# Patient Record
Sex: MALE | Race: WHITE | Employment: OTHER | URBAN - METROPOLITAN AREA
[De-identification: names, ages, dates, MRNs, and addresses within clinical notes are randomized per-mention and may not be internally consistent; named-entity substitution may affect disease eponyms.]

---

## 2017-06-20 PROBLEM — M25.522 LEFT ELBOW PAIN: Status: ACTIVE | Noted: 2017-06-20

## 2017-06-20 PROBLEM — M70.22 OLECRANON BURSITIS OF LEFT ELBOW: Status: ACTIVE | Noted: 2017-06-20

## 2017-09-20 PROBLEM — G89.29 CHRONIC PAIN OF RIGHT KNEE: Status: ACTIVE | Noted: 2017-09-20

## 2017-09-20 PROBLEM — M25.561 CHRONIC PAIN OF RIGHT KNEE: Status: ACTIVE | Noted: 2017-09-20

## 2018-03-13 ENCOUNTER — OFFICE VISIT (OUTPATIENT)
Dept: ORTHOPEDIC SURGERY | Age: 78
End: 2018-03-13
Payer: MEDICARE

## 2018-03-13 VITALS
TEMPERATURE: 97.7 F | HEIGHT: 67 IN | DIASTOLIC BLOOD PRESSURE: 98 MMHG | HEART RATE: 58 BPM | BODY MASS INDEX: 26.68 KG/M2 | WEIGHT: 170 LBS | SYSTOLIC BLOOD PRESSURE: 169 MMHG

## 2018-03-13 DIAGNOSIS — M25.552 LEFT HIP PAIN: ICD-10-CM

## 2018-03-13 DIAGNOSIS — M25.552 HIP PAIN, LEFT: Primary | ICD-10-CM

## 2018-03-13 PROCEDURE — G8427 DOCREV CUR MEDS BY ELIG CLIN: HCPCS | Performed by: ORTHOPAEDIC SURGERY

## 2018-03-13 PROCEDURE — G8417 CALC BMI ABV UP PARAM F/U: HCPCS | Performed by: ORTHOPAEDIC SURGERY

## 2018-03-13 PROCEDURE — G8484 FLU IMMUNIZE NO ADMIN: HCPCS | Performed by: ORTHOPAEDIC SURGERY

## 2018-03-13 PROCEDURE — 99213 OFFICE O/P EST LOW 20 MIN: CPT | Performed by: ORTHOPAEDIC SURGERY

## 2018-03-13 PROCEDURE — G8598 ASA/ANTIPLAT THER USED: HCPCS | Performed by: ORTHOPAEDIC SURGERY

## 2018-03-13 PROCEDURE — 4040F PNEUMOC VAC/ADMIN/RCVD: CPT | Performed by: ORTHOPAEDIC SURGERY

## 2018-03-13 PROCEDURE — 1036F TOBACCO NON-USER: CPT | Performed by: ORTHOPAEDIC SURGERY

## 2018-03-13 PROCEDURE — 1123F ACP DISCUSS/DSCN MKR DOCD: CPT | Performed by: ORTHOPAEDIC SURGERY

## 2018-03-14 ENCOUNTER — HOSPITAL ENCOUNTER (OUTPATIENT)
Age: 78
Discharge: HOME | End: 2018-03-16
Payer: MEDICARE

## 2018-03-14 LAB
ALBUMIN SERPL-MCNC: 4.7 G/DL (ref 3.5–5.2)
ALP BLD-CCNC: 71 U/L (ref 40–129)
ALT SERPL-CCNC: 27 U/L (ref 0–40)
AST SERPL-CCNC: 44 U/L (ref 0–39)
BILIRUB SERPL-MCNC: 0.7 MG/DL (ref 0–1.2)
BILIRUBIN DIRECT: <0.2 MG/DL (ref 0–0.3)
BILIRUBIN, INDIRECT: ABNORMAL MG/DL (ref 0–1)
CHOLESTEROL, TOTAL: 187 MG/DL (ref 0–199)
HDLC SERPL-MCNC: 59 MG/DL
LDL CHOLESTEROL CALCULATED: 102 MG/DL (ref 0–99)
TOTAL PROTEIN: 7.5 G/DL (ref 6.4–8.3)
TRIGL SERPL-MCNC: 128 MG/DL (ref 0–149)
VLDLC SERPL CALC-MCNC: 26 MG/DL

## 2018-03-14 PROCEDURE — 80061 LIPID PANEL: CPT

## 2018-03-14 PROCEDURE — 80076 HEPATIC FUNCTION PANEL: CPT

## 2018-07-08 ENCOUNTER — APPOINTMENT (OUTPATIENT)
Dept: GENERAL RADIOLOGY | Age: 78
End: 2018-07-08
Payer: MEDICARE

## 2018-07-08 ENCOUNTER — HOSPITAL ENCOUNTER (EMERGENCY)
Age: 78
Discharge: HOME | End: 2018-07-08
Attending: EMERGENCY MEDICINE
Payer: MEDICARE

## 2018-07-08 VITALS
BODY MASS INDEX: 25.76 KG/M2 | HEART RATE: 73 BPM | WEIGHT: 170 LBS | DIASTOLIC BLOOD PRESSURE: 87 MMHG | OXYGEN SATURATION: 95 % | HEIGHT: 68 IN | SYSTOLIC BLOOD PRESSURE: 166 MMHG | RESPIRATION RATE: 18 BRPM

## 2018-07-08 DIAGNOSIS — M79.601 RIGHT ARM PAIN: ICD-10-CM

## 2018-07-08 DIAGNOSIS — M79.602 LEFT ARM PAIN: Primary | ICD-10-CM

## 2018-07-08 PROCEDURE — 96372 THER/PROPH/DIAG INJ SC/IM: CPT

## 2018-07-08 PROCEDURE — 6360000002 HC RX W HCPCS: Performed by: PHYSICIAN ASSISTANT

## 2018-07-08 PROCEDURE — 73070 X-RAY EXAM OF ELBOW: CPT

## 2018-07-08 PROCEDURE — 99283 EMERGENCY DEPT VISIT LOW MDM: CPT

## 2018-07-08 RX ORDER — NAPROXEN 500 MG/1
500 TABLET ORAL 2 TIMES DAILY
Qty: 30 TABLET | Refills: 0 | Status: SHIPPED | OUTPATIENT
Start: 2018-07-08 | End: 2019-01-01 | Stop reason: ALTCHOICE

## 2018-07-08 RX ORDER — KETOROLAC TROMETHAMINE 30 MG/ML
30 INJECTION, SOLUTION INTRAMUSCULAR; INTRAVENOUS ONCE
Status: COMPLETED | OUTPATIENT
Start: 2018-07-08 | End: 2018-07-08

## 2018-07-08 RX ORDER — DEXAMETHASONE SODIUM PHOSPHATE 10 MG/ML
10 INJECTION INTRAMUSCULAR; INTRAVENOUS ONCE
Status: COMPLETED | OUTPATIENT
Start: 2018-07-08 | End: 2018-07-08

## 2018-07-08 RX ADMIN — DEXAMETHASONE SODIUM PHOSPHATE 10 MG: 10 INJECTION INTRAMUSCULAR; INTRAVENOUS at 08:52

## 2018-07-08 RX ADMIN — KETOROLAC TROMETHAMINE 30 MG: 30 INJECTION, SOLUTION INTRAMUSCULAR at 08:53

## 2018-07-08 ASSESSMENT — PAIN DESCRIPTION - PAIN TYPE: TYPE: ACUTE PAIN

## 2018-07-08 ASSESSMENT — PAIN SCALES - GENERAL
PAINLEVEL_OUTOF10: 7
PAINLEVEL_OUTOF10: 6

## 2018-07-08 ASSESSMENT — PAIN DESCRIPTION - ORIENTATION: ORIENTATION: RIGHT;LEFT;UPPER

## 2018-07-08 ASSESSMENT — PAIN DESCRIPTION - LOCATION: LOCATION: ARM

## 2018-07-08 NOTE — ED PROVIDER NOTES
Coronary angioplasty with stent; Colonoscopy (7/26/2012); and Endoscopy, colon, diagnostic. Social History:  reports that he has quit smoking. He has never used smokeless tobacco. He reports that he does not drink alcohol or use drugs. Family History: family history includes Cancer in his brother and sister; Heart Disease in his father and mother. The patients home medications have been reviewed. Allergies: Patient has no known allergies. -------------------------------------------------- RESULTS -------------------------------------------------  All laboratory and radiology results have been personally reviewed by myself   LABS:  No results found for this visit on 07/08/18. RADIOLOGY:  Interpreted by Radiologist.  XR ELBOW RIGHT (2 VIEWS)   Final Result   Mild degenerative changes      XR ELBOW LEFT (2 VIEWS)   Final Result   Mild degenerative changes          ------------------------- NURSING NOTES AND VITALS REVIEWED ---------------------------   The nursing notes within the ED encounter and vital signs as below have been reviewed. Pulse 72   Resp 16   Ht 5' 8\" (1.727 m)   Wt 170 lb (77.1 kg)   SpO2 97%   BMI 25.85 kg/m²   Oxygen Saturation Interpretation: Normal      ---------------------------------------------------PHYSICAL EXAM--------------------------------------      Constitutional/General: Alert and oriented x3, well appearing, non toxic in NAD  Head: NC/AT  Eyes: PERRL, EOMI  Mouth: Oropharynx clear, handling secretions, no trismus  Neck: Supple, full ROM, no meningeal signs  Pulmonary: Lungs clear to auscultation bilaterally, no wheezes, rales, or rhonchi. Not in respiratory distress  Cardiovascular:  Regular rate and rhythm, no murmurs, gallops, or rubs. 2+ distal pulses  Abdomen: Soft, + BS. No distension. Nontender. No palpable rigidity, rebound or guarding  Extremities: Moves all extremities x 4.  Warm and well perfused  Skin: warm and dry without rash  Neurologic: GCS

## 2018-09-12 ENCOUNTER — HOSPITAL ENCOUNTER (OUTPATIENT)
Age: 78
Discharge: HOME | End: 2018-09-14
Payer: MEDICARE

## 2018-09-12 LAB
ALBUMIN SERPL-MCNC: 4.5 G/DL (ref 3.5–5.2)
ALP BLD-CCNC: 104 U/L (ref 40–129)
ALT SERPL-CCNC: 27 U/L (ref 0–40)
AST SERPL-CCNC: 34 U/L (ref 0–39)
BILIRUB SERPL-MCNC: 0.5 MG/DL (ref 0–1.2)
BILIRUBIN DIRECT: <0.2 MG/DL (ref 0–0.3)
BILIRUBIN, INDIRECT: NORMAL MG/DL (ref 0–1)
CHOLESTEROL, TOTAL: 165 MG/DL (ref 0–199)
HDLC SERPL-MCNC: 48 MG/DL
LDL CHOLESTEROL CALCULATED: 95 MG/DL (ref 0–99)
TOTAL PROTEIN: 7.1 G/DL (ref 6.4–8.3)
TRIGL SERPL-MCNC: 112 MG/DL (ref 0–149)
VLDLC SERPL CALC-MCNC: 22 MG/DL

## 2018-09-12 PROCEDURE — 80076 HEPATIC FUNCTION PANEL: CPT

## 2018-09-12 PROCEDURE — 80061 LIPID PANEL: CPT

## 2019-01-01 ENCOUNTER — PREP FOR PROCEDURE (OUTPATIENT)
Dept: SURGERY | Age: 79
End: 2019-01-01

## 2019-01-01 ENCOUNTER — HOSPITAL ENCOUNTER (OUTPATIENT)
Age: 79
Setting detail: OUTPATIENT SURGERY
Discharge: HOME | End: 2019-12-06
Attending: SURGERY | Admitting: SURGERY
Payer: MEDICARE

## 2019-01-01 ENCOUNTER — CARE COORDINATION (OUTPATIENT)
Dept: CASE MANAGEMENT | Age: 79
End: 2019-01-01

## 2019-01-01 ENCOUNTER — HOSPITAL ENCOUNTER (OUTPATIENT)
Age: 79
Discharge: HOME | End: 2019-12-20
Payer: MEDICARE

## 2019-01-01 ENCOUNTER — HOSPITAL ENCOUNTER (INPATIENT)
Age: 79
LOS: 1 days | Discharge: HOME | DRG: 310 | End: 2019-10-03
Attending: EMERGENCY MEDICINE | Admitting: INTERNAL MEDICINE
Payer: MEDICARE

## 2019-01-01 ENCOUNTER — APPOINTMENT (OUTPATIENT)
Dept: GENERAL RADIOLOGY | Age: 79
DRG: 310 | End: 2019-01-01
Payer: MEDICARE

## 2019-01-01 ENCOUNTER — HOSPITAL ENCOUNTER (OUTPATIENT)
Age: 79
Discharge: HOME | End: 2019-09-13
Payer: MEDICARE

## 2019-01-01 VITALS
WEIGHT: 160 LBS | HEART RATE: 70 BPM | TEMPERATURE: 98 F | RESPIRATION RATE: 16 BRPM | SYSTOLIC BLOOD PRESSURE: 154 MMHG | DIASTOLIC BLOOD PRESSURE: 94 MMHG | BODY MASS INDEX: 25.06 KG/M2 | OXYGEN SATURATION: 96 %

## 2019-01-01 VITALS
RESPIRATION RATE: 17 BRPM | SYSTOLIC BLOOD PRESSURE: 99 MMHG | DIASTOLIC BLOOD PRESSURE: 58 MMHG | HEIGHT: 67 IN | OXYGEN SATURATION: 98 % | WEIGHT: 137 LBS | BODY MASS INDEX: 21.5 KG/M2 | TEMPERATURE: 97.8 F | HEART RATE: 67 BPM

## 2019-01-01 DIAGNOSIS — R00.1 BRADYCARDIA: Primary | ICD-10-CM

## 2019-01-01 LAB
ALBUMIN SERPL-MCNC: 3.8 G/DL (ref 3.5–5.2)
ALBUMIN SERPL-MCNC: 3.9 G/DL (ref 3.5–5.2)
ALBUMIN SERPL-MCNC: 4 G/DL (ref 3.5–5.2)
ALP BLD-CCNC: 109 U/L (ref 40–129)
ALP BLD-CCNC: 125 U/L (ref 40–129)
ALP BLD-CCNC: 126 U/L (ref 40–129)
ALT SERPL-CCNC: 14 U/L (ref 0–40)
ALT SERPL-CCNC: 29 U/L (ref 0–40)
ALT SERPL-CCNC: 37 U/L (ref 0–40)
ANION GAP SERPL CALCULATED.3IONS-SCNC: 10 MMOL/L (ref 7–16)
ANION GAP SERPL CALCULATED.3IONS-SCNC: 16 MMOL/L (ref 7–16)
ANISOCYTOSIS: ABNORMAL
AST SERPL-CCNC: 22 U/L (ref 0–39)
AST SERPL-CCNC: 24 U/L (ref 0–39)
AST SERPL-CCNC: 28 U/L (ref 0–39)
BASOPHILS ABSOLUTE: 0.11 E9/L (ref 0–0.2)
BASOPHILS RELATIVE PERCENT: 0.9 % (ref 0–2)
BILIRUB SERPL-MCNC: 0.5 MG/DL (ref 0–1.2)
BILIRUB SERPL-MCNC: 0.7 MG/DL (ref 0–1.2)
BILIRUB SERPL-MCNC: 1 MG/DL (ref 0–1.2)
BILIRUBIN DIRECT: 0.2 MG/DL (ref 0–0.3)
BILIRUBIN DIRECT: 0.3 MG/DL (ref 0–0.3)
BILIRUBIN DIRECT: <0.2 MG/DL (ref 0–0.3)
BILIRUBIN, INDIRECT: 0.5 MG/DL (ref 0–1)
BILIRUBIN, INDIRECT: 0.7 MG/DL (ref 0–1)
BILIRUBIN, INDIRECT: NORMAL MG/DL (ref 0–1)
BUN BLDV-MCNC: 20 MG/DL (ref 8–23)
BUN BLDV-MCNC: 25 MG/DL (ref 8–23)
BURR CELLS: ABNORMAL
C-REACTIVE PROTEIN: 6.3 MG/DL (ref 0–0.4)
CALCIUM SERPL-MCNC: 9.1 MG/DL (ref 8.6–10.2)
CALCIUM SERPL-MCNC: 9.5 MG/DL (ref 8.6–10.2)
CHLORIDE BLD-SCNC: 104 MMOL/L (ref 98–107)
CHLORIDE BLD-SCNC: 106 MMOL/L (ref 98–107)
CHOLESTEROL, TOTAL: 138 MG/DL (ref 0–199)
CO2: 23 MMOL/L (ref 22–29)
CO2: 27 MMOL/L (ref 22–29)
CREAT SERPL-MCNC: 1.1 MG/DL (ref 0.7–1.2)
CREAT SERPL-MCNC: 1.1 MG/DL (ref 0.7–1.2)
EKG ATRIAL RATE: 250 BPM
EKG P AXIS: -90 DEGREES
EKG Q-T INTERVAL: 538 MS
EKG QRS DURATION: 72 MS
EKG QTC CALCULATION (BAZETT): 454 MS
EKG R AXIS: 60 DEGREES
EKG T AXIS: 89 DEGREES
EKG VENTRICULAR RATE: 43 BPM
EOSINOPHILS ABSOLUTE: 0.22 E9/L (ref 0.05–0.5)
EOSINOPHILS RELATIVE PERCENT: 1.8 % (ref 0–6)
GFR AFRICAN AMERICAN: >60
GFR NON-AFRICAN AMERICAN: >60 ML/MIN/1.73
GLUCOSE BLD-MCNC: 85 MG/DL (ref 74–99)
GLUCOSE BLD-MCNC: 94 MG/DL (ref 74–99)
GLUCOSE BLD-MCNC: 98 MG/DL (ref 74–99)
HCT VFR BLD CALC: 37.3 % (ref 37–54)
HCT VFR BLD CALC: 41.1 % (ref 37–54)
HCT VFR BLD CALC: 41.3 % (ref 37–54)
HDLC SERPL-MCNC: 47 MG/DL
HEMOGLOBIN: 11.7 G/DL (ref 12.5–16.5)
HEMOGLOBIN: 12.8 G/DL (ref 12.5–16.5)
IMMATURE RETIC FRACT: 6.2 % (ref 2.3–13.4)
IMMATURE RETIC FRACT: 9 % (ref 2.3–13.4)
IRON SATURATION: 9 % (ref 20–55)
IRON: 26 MCG/DL (ref 59–158)
LDL CHOLESTEROL CALCULATED: 78 MG/DL (ref 0–99)
LYMPHOCYTES ABSOLUTE: 0.48 E9/L (ref 1.5–4)
LYMPHOCYTES RELATIVE PERCENT: 4.4 % (ref 20–42)
MCH RBC QN AUTO: 24.5 PG (ref 26–35)
MCH RBC QN AUTO: 25.2 PG (ref 26–35)
MCHC RBC AUTO-ENTMCNC: 28.5 % (ref 32–34.5)
MCHC RBC AUTO-ENTMCNC: 31 % (ref 32–34.5)
MCV RBC AUTO: 81.3 FL (ref 80–99.9)
MCV RBC AUTO: 86.2 FL (ref 80–99.9)
MONOCYTES ABSOLUTE: 0.84 E9/L (ref 0.1–0.95)
MONOCYTES RELATIVE PERCENT: 7.1 % (ref 2–12)
NEUTROPHILS ABSOLUTE: 10.32 E9/L (ref 1.8–7.3)
NEUTROPHILS RELATIVE PERCENT: 85.8 % (ref 43–80)
OVALOCYTES: ABNORMAL
PDW BLD-RTO: 15.6 FL (ref 11.5–15)
PDW BLD-RTO: 16.8 FL (ref 11.5–15)
PERFORMED ON: ABNORMAL
PLATELET # BLD: 212 E9/L (ref 130–450)
PLATELET # BLD: 367 E9/L (ref 130–450)
PMV BLD AUTO: 10.6 FL (ref 7–12)
PMV BLD AUTO: 10.7 FL (ref 7–12)
POC CHLORIDE: 110 MMOL/L (ref 100–108)
POC CREATININE: 1.1 MG/DL (ref 0.7–1.2)
POC POTASSIUM: 4.5 MMOL/L (ref 3.5–5)
POC SODIUM: 141 MMOL/L (ref 132–146)
POIKILOCYTES: ABNORMAL
POTASSIUM REFLEX MAGNESIUM: 4.7 MMOL/L (ref 3.5–5)
POTASSIUM REFLEX MAGNESIUM: 4.8 MMOL/L (ref 3.5–5)
PREALBUMIN: 15 MG/DL (ref 20–40)
RBC # BLD: 4.77 E12/L (ref 3.8–5.8)
RBC # BLD: 5.08 E12/L (ref 3.8–5.8)
RETIC HGB EQUIVALENT: 28.5 PG (ref 28.2–36.6)
RETIC HGB EQUIVALENT: 30.6 PG (ref 28.2–36.6)
RETICULOCYTE ABSOLUTE COUNT: 0.02 E12/L
RETICULOCYTE ABSOLUTE COUNT: 0.05 E12/L
RETICULOCYTE COUNT PCT: 0.5 % (ref 0.4–1.9)
RETICULOCYTE COUNT PCT: 1 % (ref 0.4–1.9)
SEDIMENTATION RATE, ERYTHROCYTE: 60 MM/HR (ref 0–15)
SODIUM BLD-SCNC: 143 MMOL/L (ref 132–146)
SODIUM BLD-SCNC: 143 MMOL/L (ref 132–146)
TOTAL IRON BINDING CAPACITY: 274 MCG/DL (ref 250–450)
TOTAL PROTEIN: 7 G/DL (ref 6.4–8.3)
TOTAL PROTEIN: 7.2 G/DL (ref 6.4–8.3)
TOTAL PROTEIN: 7.3 G/DL (ref 6.4–8.3)
TRANSFERRIN: 206 MG/DL (ref 200–360)
TRIGL SERPL-MCNC: 64 MG/DL (ref 0–149)
TROPONIN: 0.01 NG/ML (ref 0–0.03)
URIC ACID, SERUM: 7.2 MG/DL (ref 3.4–7)
VLDLC SERPL CALC-MCNC: 13 MG/DL
WBC # BLD: 12 E9/L (ref 4.5–11.5)
WBC # BLD: 16.9 E9/L (ref 4.5–11.5)

## 2019-01-01 PROCEDURE — 36415 COLL VENOUS BLD VENIPUNCTURE: CPT

## 2019-01-01 PROCEDURE — 80076 HEPATIC FUNCTION PANEL: CPT

## 2019-01-01 PROCEDURE — 6370000000 HC RX 637 (ALT 250 FOR IP): Performed by: INTERNAL MEDICINE

## 2019-01-01 PROCEDURE — 2580000003 HC RX 258: Performed by: INTERNAL MEDICINE

## 2019-01-01 PROCEDURE — 80061 LIPID PANEL: CPT

## 2019-01-01 PROCEDURE — 85025 COMPLETE CBC W/AUTO DIFF WBC: CPT

## 2019-01-01 PROCEDURE — 84132 ASSAY OF SERUM POTASSIUM: CPT

## 2019-01-01 PROCEDURE — 99152 MOD SED SAME PHYS/QHP 5/>YRS: CPT | Performed by: SURGERY

## 2019-01-01 PROCEDURE — 86140 C-REACTIVE PROTEIN: CPT

## 2019-01-01 PROCEDURE — 88305 TISSUE EXAM BY PATHOLOGIST: CPT

## 2019-01-01 PROCEDURE — 99153 MOD SED SAME PHYS/QHP EA: CPT | Performed by: SURGERY

## 2019-01-01 PROCEDURE — 82565 ASSAY OF CREATININE: CPT

## 2019-01-01 PROCEDURE — 3609027000 HC COLONOSCOPY: Performed by: SURGERY

## 2019-01-01 PROCEDURE — 2580000003 HC RX 258: Performed by: SURGERY

## 2019-01-01 PROCEDURE — 2709999900 HC NON-CHARGEABLE SUPPLY: Performed by: SURGERY

## 2019-01-01 PROCEDURE — 88342 IMHCHEM/IMCYTCHM 1ST ANTB: CPT

## 2019-01-01 PROCEDURE — 96372 THER/PROPH/DIAG INJ SC/IM: CPT

## 2019-01-01 PROCEDURE — G0378 HOSPITAL OBSERVATION PER HR: HCPCS

## 2019-01-01 PROCEDURE — 82435 ASSAY OF BLOOD CHLORIDE: CPT

## 2019-01-01 PROCEDURE — 84550 ASSAY OF BLOOD/URIC ACID: CPT

## 2019-01-01 PROCEDURE — 3609012400 HC EGD TRANSORAL BIOPSY SINGLE/MULTIPLE: Performed by: SURGERY

## 2019-01-01 PROCEDURE — 84466 ASSAY OF TRANSFERRIN: CPT

## 2019-01-01 PROCEDURE — 85045 AUTOMATED RETICULOCYTE COUNT: CPT

## 2019-01-01 PROCEDURE — 85651 RBC SED RATE NONAUTOMATED: CPT

## 2019-01-01 PROCEDURE — 6360000002 HC RX W HCPCS: Performed by: INTERNAL MEDICINE

## 2019-01-01 PROCEDURE — 7100000011 HC PHASE II RECOVERY - ADDTL 15 MIN: Performed by: SURGERY

## 2019-01-01 PROCEDURE — 6360000002 HC RX W HCPCS: Performed by: SURGERY

## 2019-01-01 PROCEDURE — 85027 COMPLETE CBC AUTOMATED: CPT

## 2019-01-01 PROCEDURE — 99285 EMERGENCY DEPT VISIT HI MDM: CPT

## 2019-01-01 PROCEDURE — 83550 IRON BINDING TEST: CPT

## 2019-01-01 PROCEDURE — 93010 ELECTROCARDIOGRAM REPORT: CPT | Performed by: INTERNAL MEDICINE

## 2019-01-01 PROCEDURE — 80048 BASIC METABOLIC PNL TOTAL CA: CPT

## 2019-01-01 PROCEDURE — 7100000010 HC PHASE II RECOVERY - FIRST 15 MIN: Performed by: SURGERY

## 2019-01-01 PROCEDURE — 93005 ELECTROCARDIOGRAM TRACING: CPT | Performed by: EMERGENCY MEDICINE

## 2019-01-01 PROCEDURE — 84484 ASSAY OF TROPONIN QUANT: CPT

## 2019-01-01 PROCEDURE — 84295 ASSAY OF SERUM SODIUM: CPT

## 2019-01-01 PROCEDURE — 2140000000 HC CCU INTERMEDIATE R&B

## 2019-01-01 PROCEDURE — 83540 ASSAY OF IRON: CPT

## 2019-01-01 PROCEDURE — 82947 ASSAY GLUCOSE BLOOD QUANT: CPT

## 2019-01-01 PROCEDURE — 71045 X-RAY EXAM CHEST 1 VIEW: CPT

## 2019-01-01 PROCEDURE — 84134 ASSAY OF PREALBUMIN: CPT

## 2019-01-01 RX ORDER — LOSARTAN POTASSIUM 50 MG/1
50 TABLET ORAL DAILY
Status: DISCONTINUED | OUTPATIENT
Start: 2019-01-01 | End: 2019-01-01

## 2019-01-01 RX ORDER — ONDANSETRON 2 MG/ML
4 INJECTION INTRAMUSCULAR; INTRAVENOUS EVERY 6 HOURS PRN
Status: DISCONTINUED | OUTPATIENT
Start: 2019-01-01 | End: 2019-01-01 | Stop reason: HOSPADM

## 2019-01-01 RX ORDER — SODIUM CHLORIDE 0.9 % (FLUSH) 0.9 %
10 SYRINGE (ML) INJECTION PRN
Status: DISCONTINUED | OUTPATIENT
Start: 2019-01-01 | End: 2019-01-01 | Stop reason: HOSPADM

## 2019-01-01 RX ORDER — HYDRALAZINE HYDROCHLORIDE 20 MG/ML
10 INJECTION INTRAMUSCULAR; INTRAVENOUS EVERY 6 HOURS PRN
Status: DISCONTINUED | OUTPATIENT
Start: 2019-01-01 | End: 2019-01-01 | Stop reason: HOSPADM

## 2019-01-01 RX ORDER — NIFEDIPINE 30 MG/1
30 TABLET, FILM COATED, EXTENDED RELEASE ORAL DAILY
Qty: 30 TABLET | Refills: 3 | Status: ON HOLD | OUTPATIENT
Start: 2019-01-01 | End: 2020-01-01 | Stop reason: HOSPADM

## 2019-01-01 RX ORDER — SODIUM CHLORIDE 0.9 % (FLUSH) 0.9 %
10 SYRINGE (ML) INJECTION EVERY 12 HOURS SCHEDULED
Status: DISCONTINUED | OUTPATIENT
Start: 2019-01-01 | End: 2019-01-01 | Stop reason: HOSPADM

## 2019-01-01 RX ORDER — SODIUM CHLORIDE 9 MG/ML
INJECTION, SOLUTION INTRAVENOUS CONTINUOUS
Status: CANCELLED | OUTPATIENT
Start: 2019-01-01

## 2019-01-01 RX ORDER — ATORVASTATIN CALCIUM 10 MG/1
20 TABLET, FILM COATED ORAL DAILY
Status: DISCONTINUED | OUTPATIENT
Start: 2019-01-01 | End: 2019-01-01 | Stop reason: HOSPADM

## 2019-01-01 RX ORDER — NIFEDIPINE 30 MG/1
30 TABLET, EXTENDED RELEASE ORAL DAILY
Status: DISCONTINUED | OUTPATIENT
Start: 2019-01-01 | End: 2019-01-01 | Stop reason: HOSPADM

## 2019-01-01 RX ORDER — SODIUM CHLORIDE 9 MG/ML
INJECTION, SOLUTION INTRAVENOUS CONTINUOUS
Status: DISCONTINUED | OUTPATIENT
Start: 2019-01-01 | End: 2019-01-01 | Stop reason: HOSPADM

## 2019-01-01 RX ORDER — OLMESARTAN MEDOXOMIL 20 MG/1
40 TABLET ORAL DAILY
Status: DISCONTINUED | OUTPATIENT
Start: 2019-01-01 | End: 2019-01-01 | Stop reason: HOSPADM

## 2019-01-01 RX ORDER — ACETAMINOPHEN 325 MG/1
650 TABLET ORAL EVERY 4 HOURS PRN
Status: DISCONTINUED | OUTPATIENT
Start: 2019-01-01 | End: 2019-01-01 | Stop reason: HOSPADM

## 2019-01-01 RX ORDER — CLOPIDOGREL BISULFATE 75 MG/1
75 TABLET ORAL DAILY
Status: DISCONTINUED | OUTPATIENT
Start: 2019-01-01 | End: 2019-01-01 | Stop reason: HOSPADM

## 2019-01-01 RX ORDER — SUCRALFATE 1 G/1
1 TABLET ORAL 2 TIMES DAILY
Qty: 60 TABLET | Refills: 3 | Status: SHIPPED | OUTPATIENT
Start: 2019-01-01

## 2019-01-01 RX ORDER — MIDAZOLAM HYDROCHLORIDE 1 MG/ML
INJECTION INTRAMUSCULAR; INTRAVENOUS PRN
Status: DISCONTINUED | OUTPATIENT
Start: 2019-01-01 | End: 2019-01-01 | Stop reason: ALTCHOICE

## 2019-01-01 RX ORDER — SODIUM CHLORIDE 0.9 % (FLUSH) 0.9 %
10 SYRINGE (ML) INJECTION PRN
Status: CANCELLED | OUTPATIENT
Start: 2019-01-01

## 2019-01-01 RX ORDER — ALLOPURINOL 300 MG/1
150 TABLET ORAL DAILY
Status: DISCONTINUED | OUTPATIENT
Start: 2019-01-01 | End: 2019-01-01 | Stop reason: HOSPADM

## 2019-01-01 RX ORDER — AMLODIPINE BESYLATE 5 MG/1
5 TABLET ORAL ONCE
Status: COMPLETED | OUTPATIENT
Start: 2019-01-01 | End: 2019-01-01

## 2019-01-01 RX ORDER — OLMESARTAN MEDOXOMIL 40 MG/1
40 TABLET ORAL DAILY
Qty: 30 TABLET | Refills: 3 | Status: ON HOLD | OUTPATIENT
Start: 2019-01-01 | End: 2020-01-01 | Stop reason: HOSPADM

## 2019-01-01 RX ORDER — AMLODIPINE BESYLATE 5 MG/1
5 TABLET ORAL DAILY
Status: DISCONTINUED | OUTPATIENT
Start: 2019-01-01 | End: 2019-01-01

## 2019-01-01 RX ORDER — MEPERIDINE HYDROCHLORIDE 50 MG/ML
INJECTION INTRAMUSCULAR; INTRAVENOUS; SUBCUTANEOUS PRN
Status: DISCONTINUED | OUTPATIENT
Start: 2019-01-01 | End: 2019-01-01 | Stop reason: ALTCHOICE

## 2019-01-01 RX ORDER — SODIUM CHLORIDE 0.9 % (FLUSH) 0.9 %
10 SYRINGE (ML) INJECTION EVERY 12 HOURS SCHEDULED
Status: CANCELLED | OUTPATIENT
Start: 2019-01-01

## 2019-01-01 RX ADMIN — ENOXAPARIN SODIUM 70 MG: 80 INJECTION SUBCUTANEOUS at 08:31

## 2019-01-01 RX ADMIN — Medication 10 ML: at 22:27

## 2019-01-01 RX ADMIN — Medication 10 ML: at 08:31

## 2019-01-01 RX ADMIN — ALLOPURINOL 150 MG: 300 TABLET ORAL at 08:31

## 2019-01-01 RX ADMIN — ENOXAPARIN SODIUM 70 MG: 80 INJECTION SUBCUTANEOUS at 22:11

## 2019-01-01 RX ADMIN — Medication 10 ML: at 08:48

## 2019-01-01 RX ADMIN — APIXABAN 5 MG: 5 TABLET, FILM COATED ORAL at 08:48

## 2019-01-01 RX ADMIN — CLOPIDOGREL 75 MG: 75 TABLET, FILM COATED ORAL at 08:49

## 2019-01-01 RX ADMIN — ATORVASTATIN CALCIUM 20 MG: 10 TABLET, FILM COATED ORAL at 08:48

## 2019-01-01 RX ADMIN — ATORVASTATIN CALCIUM 20 MG: 10 TABLET, FILM COATED ORAL at 08:35

## 2019-01-01 RX ADMIN — LOSARTAN POTASSIUM 50 MG: 50 TABLET, FILM COATED ORAL at 10:35

## 2019-01-01 RX ADMIN — HYDRALAZINE HYDROCHLORIDE 10 MG: 20 INJECTION, SOLUTION INTRAMUSCULAR; INTRAVENOUS at 05:49

## 2019-01-01 RX ADMIN — NIFEDIPINE 30 MG: 30 TABLET, FILM COATED, EXTENDED RELEASE ORAL at 08:48

## 2019-01-01 RX ADMIN — APIXABAN 5 MG: 5 TABLET, FILM COATED ORAL at 22:26

## 2019-01-01 RX ADMIN — ALLOPURINOL 150 MG: 300 TABLET ORAL at 08:49

## 2019-01-01 RX ADMIN — AMLODIPINE BESYLATE 5 MG: 5 TABLET ORAL at 08:30

## 2019-01-01 RX ADMIN — HYDRALAZINE HYDROCHLORIDE 10 MG: 20 INJECTION, SOLUTION INTRAMUSCULAR; INTRAVENOUS at 19:46

## 2019-01-01 RX ADMIN — LOSARTAN POTASSIUM 50 MG: 50 TABLET, FILM COATED ORAL at 08:49

## 2019-01-01 RX ADMIN — AMLODIPINE BESYLATE 5 MG: 5 TABLET ORAL at 01:22

## 2019-01-01 RX ADMIN — SODIUM CHLORIDE: 9 INJECTION, SOLUTION INTRAVENOUS at 09:31

## 2019-01-01 RX ADMIN — CLOPIDOGREL 75 MG: 75 TABLET, FILM COATED ORAL at 08:30

## 2019-01-01 ASSESSMENT — PAIN SCALES - GENERAL
PAINLEVEL_OUTOF10: 0

## 2019-01-01 ASSESSMENT — PAIN - FUNCTIONAL ASSESSMENT: PAIN_FUNCTIONAL_ASSESSMENT: 0-10

## 2019-03-13 ENCOUNTER — HOSPITAL ENCOUNTER (OUTPATIENT)
Age: 79
Discharge: HOME | End: 2019-03-15
Payer: MEDICARE

## 2019-03-13 LAB
ALBUMIN SERPL-MCNC: 4.2 G/DL (ref 3.5–5.2)
ALP BLD-CCNC: 72 U/L (ref 40–129)
ALT SERPL-CCNC: 15 U/L (ref 0–40)
AST SERPL-CCNC: 30 U/L (ref 0–39)
BILIRUB SERPL-MCNC: 0.4 MG/DL (ref 0–1.2)
BILIRUBIN DIRECT: <0.2 MG/DL (ref 0–0.3)
BILIRUBIN, INDIRECT: NORMAL MG/DL (ref 0–1)
CHOLESTEROL, TOTAL: 124 MG/DL (ref 0–199)
FOLATE: >20 NG/ML (ref 4.8–24.2)
HCT VFR BLD CALC: 38.2 % (ref 37–54)
HDLC SERPL-MCNC: 46 MG/DL
IMMATURE RETIC FRACT: 12.5 % (ref 2.3–13.4)
IRON SATURATION: 13 % (ref 20–55)
IRON: 37 MCG/DL (ref 59–158)
LDL CHOLESTEROL CALCULATED: 65 MG/DL (ref 0–99)
RETIC HGB EQUIVALENT: 28.8 PG (ref 28.2–36.6)
RETICULOCYTE ABSOLUTE COUNT: 0.07 E12/L
RETICULOCYTE COUNT PCT: 1.7 % (ref 0.4–1.9)
TOTAL IRON BINDING CAPACITY: 286 MCG/DL (ref 250–450)
TOTAL PROTEIN: 7.1 G/DL (ref 6.4–8.3)
TRIGL SERPL-MCNC: 64 MG/DL (ref 0–149)
VITAMIN B-12: 1978 PG/ML (ref 211–946)
VITAMIN D 25-HYDROXY: 23 NG/ML (ref 30–100)
VLDLC SERPL CALC-MCNC: 13 MG/DL

## 2019-03-13 PROCEDURE — 83550 IRON BINDING TEST: CPT

## 2019-03-13 PROCEDURE — 82746 ASSAY OF FOLIC ACID SERUM: CPT

## 2019-03-13 PROCEDURE — 80076 HEPATIC FUNCTION PANEL: CPT

## 2019-03-13 PROCEDURE — 82306 VITAMIN D 25 HYDROXY: CPT

## 2019-03-13 PROCEDURE — 83540 ASSAY OF IRON: CPT

## 2019-03-13 PROCEDURE — 80061 LIPID PANEL: CPT

## 2019-03-13 PROCEDURE — 82607 VITAMIN B-12: CPT

## 2019-03-13 PROCEDURE — 85045 AUTOMATED RETICULOCYTE COUNT: CPT

## 2019-08-06 ENCOUNTER — OFFICE VISIT (OUTPATIENT)
Dept: ORTHOPEDIC SURGERY | Age: 79
End: 2019-08-06
Payer: MEDICARE

## 2019-08-06 VITALS
DIASTOLIC BLOOD PRESSURE: 76 MMHG | BODY MASS INDEX: 25.11 KG/M2 | HEIGHT: 67 IN | WEIGHT: 160 LBS | SYSTOLIC BLOOD PRESSURE: 140 MMHG | HEART RATE: 46 BPM | TEMPERATURE: 97.4 F

## 2019-08-06 DIAGNOSIS — M1A.9XX1 TOPHACEOUS GOUT OF JOINT: Primary | ICD-10-CM

## 2019-08-06 PROCEDURE — G8598 ASA/ANTIPLAT THER USED: HCPCS | Performed by: ORTHOPAEDIC SURGERY

## 2019-08-06 PROCEDURE — G8419 CALC BMI OUT NRM PARAM NOF/U: HCPCS | Performed by: ORTHOPAEDIC SURGERY

## 2019-08-06 PROCEDURE — 20600 DRAIN/INJ JOINT/BURSA W/O US: CPT | Performed by: ORTHOPAEDIC SURGERY

## 2019-08-06 PROCEDURE — 4040F PNEUMOC VAC/ADMIN/RCVD: CPT | Performed by: ORTHOPAEDIC SURGERY

## 2019-08-06 PROCEDURE — 99213 OFFICE O/P EST LOW 20 MIN: CPT | Performed by: ORTHOPAEDIC SURGERY

## 2019-08-06 PROCEDURE — G8427 DOCREV CUR MEDS BY ELIG CLIN: HCPCS | Performed by: ORTHOPAEDIC SURGERY

## 2019-08-06 PROCEDURE — 1036F TOBACCO NON-USER: CPT | Performed by: ORTHOPAEDIC SURGERY

## 2019-08-06 PROCEDURE — 1123F ACP DISCUSS/DSCN MKR DOCD: CPT | Performed by: ORTHOPAEDIC SURGERY

## 2019-08-06 RX ORDER — LIDOCAINE HYDROCHLORIDE 10 MG/ML
0.5 INJECTION, SOLUTION INFILTRATION; PERINEURAL ONCE
Status: COMPLETED | OUTPATIENT
Start: 2019-08-06 | End: 2019-08-06

## 2019-08-06 RX ORDER — TRIAMCINOLONE ACETONIDE 40 MG/ML
20 INJECTION, SUSPENSION INTRA-ARTICULAR; INTRAMUSCULAR ONCE
Status: COMPLETED | OUTPATIENT
Start: 2019-08-06 | End: 2019-08-06

## 2019-08-06 RX ADMIN — LIDOCAINE HYDROCHLORIDE 0.5 ML: 10 INJECTION, SOLUTION INFILTRATION; PERINEURAL at 13:57

## 2019-08-06 RX ADMIN — TRIAMCINOLONE ACETONIDE 20 MG: 40 INJECTION, SUSPENSION INTRA-ARTICULAR; INTRAMUSCULAR at 13:58

## 2019-08-07 NOTE — PROGRESS NOTES
Chief Complaint:   Chief Complaint   Patient presents with    Finger Pain     Pain right middle and small fingers. ? Growths both fingers. No X-rays Dr Henrietta Pratt Rx'd Indocin but no improvment of Rt middle finger. Rianna Moody presents with recurring pain and swelling especially of the right middle finger PIP associate with some deformity no injury, does have history of gout in the past, symptoms not improving with Indocin. No fever chills sweats or other systemic symptoms. Secondary complaint of a mass on the dorsal aspect of the right small finger PIP without injury. Symptoms especially in the right middle finger interfering with dexterity and self-care. Pain is constant not relieved with rest or oral medication. Allergies; medications; past medical, surgical, family, and social history; and problem list have been reviewed today and updated as indicated in this encounter seen below. Exam: Both hands show significant multiple interphalangeal deformities which are chronic, right middle finger PIP however shows erythema and subcutaneous nodules that are consistent with tophaceous gout. Significant deformity and significant stiffness with that finger as well. No lymphangitis, very benign looking dorsal mass at the PIP of the right small finger as well. Radiographs: Deferred today    Rhodia Spatz was seen today for finger pain.     Diagnoses and all orders for this visit:    Tophaceous gout of joint  -     UT ARTHROCENTESIS ASPIR&/INJ SMALL JT/BURSA W/O US    Ganglion cyst of flexor tendon sheath of finger of right hand    Other orders  -     lidocaine 1 % injection 0.5 mL  -     triamcinolone acetonide (KENALOG-40) injection 20 mg       Treatment options were reviewed, given the severe pain and swelling of the right middle PIP 80 I recommended and performed aspiration under local anesthesia obtaining a small amount of whitish toothpaste appearing material consistent with tophaceous gout, moderate

## 2019-08-20 ENCOUNTER — OFFICE VISIT (OUTPATIENT)
Dept: ORTHOPEDIC SURGERY | Age: 79
End: 2019-08-20
Payer: MEDICARE

## 2019-08-20 VITALS
BODY MASS INDEX: 25.11 KG/M2 | HEART RATE: 43 BPM | SYSTOLIC BLOOD PRESSURE: 142 MMHG | HEIGHT: 67 IN | TEMPERATURE: 97.1 F | WEIGHT: 160 LBS | DIASTOLIC BLOOD PRESSURE: 73 MMHG

## 2019-08-20 DIAGNOSIS — M1A.9XX1 TOPHACEOUS GOUT OF JOINT: Primary | ICD-10-CM

## 2019-08-20 PROCEDURE — 4040F PNEUMOC VAC/ADMIN/RCVD: CPT | Performed by: ORTHOPAEDIC SURGERY

## 2019-08-20 PROCEDURE — G8427 DOCREV CUR MEDS BY ELIG CLIN: HCPCS | Performed by: ORTHOPAEDIC SURGERY

## 2019-08-20 PROCEDURE — 1036F TOBACCO NON-USER: CPT | Performed by: ORTHOPAEDIC SURGERY

## 2019-08-20 PROCEDURE — 1123F ACP DISCUSS/DSCN MKR DOCD: CPT | Performed by: ORTHOPAEDIC SURGERY

## 2019-08-20 PROCEDURE — G8598 ASA/ANTIPLAT THER USED: HCPCS | Performed by: ORTHOPAEDIC SURGERY

## 2019-08-20 PROCEDURE — 20600 DRAIN/INJ JOINT/BURSA W/O US: CPT | Performed by: ORTHOPAEDIC SURGERY

## 2019-08-20 PROCEDURE — 99213 OFFICE O/P EST LOW 20 MIN: CPT | Performed by: ORTHOPAEDIC SURGERY

## 2019-08-20 PROCEDURE — G8419 CALC BMI OUT NRM PARAM NOF/U: HCPCS | Performed by: ORTHOPAEDIC SURGERY

## 2019-08-20 RX ORDER — LIDOCAINE HYDROCHLORIDE 10 MG/ML
0.5 INJECTION, SOLUTION INFILTRATION; PERINEURAL ONCE
Status: DISCONTINUED | OUTPATIENT
Start: 2019-08-20 | End: 2019-08-20

## 2019-08-20 RX ORDER — TRIAMCINOLONE ACETONIDE 40 MG/ML
20 INJECTION, SUSPENSION INTRA-ARTICULAR; INTRAMUSCULAR ONCE
Status: DISCONTINUED | OUTPATIENT
Start: 2019-08-20 | End: 2019-08-20

## 2019-10-01 PROBLEM — R00.1 BRADYCARDIA: Status: ACTIVE | Noted: 2019-01-01

## 2019-10-03 PROBLEM — I48.92 ATRIAL FIBRILLATION AND FLUTTER (HCC): Chronic | Status: ACTIVE | Noted: 2019-01-01

## 2019-10-03 PROBLEM — I48.91 ATRIAL FIBRILLATION AND FLUTTER (HCC): Chronic | Status: ACTIVE | Noted: 2019-01-01

## 2019-10-03 PROBLEM — R00.1 SYMPTOMATIC BRADYCARDIA: Status: ACTIVE | Noted: 2019-01-01

## 2020-01-01 ENCOUNTER — HOSPITAL ENCOUNTER (OUTPATIENT)
Age: 80
Setting detail: OBSERVATION
LOS: 1 days | Discharge: HOME | End: 2020-02-18
Attending: INTERNAL MEDICINE | Admitting: INTERNAL MEDICINE
Payer: MEDICARE

## 2020-01-01 ENCOUNTER — CARE COORDINATION (OUTPATIENT)
Dept: CASE MANAGEMENT | Age: 80
End: 2020-01-01

## 2020-01-01 ENCOUNTER — APPOINTMENT (OUTPATIENT)
Dept: CT IMAGING | Age: 80
DRG: 177 | End: 2020-01-01
Attending: INTERNAL MEDICINE
Payer: MEDICARE

## 2020-01-01 ENCOUNTER — TELEPHONE (OUTPATIENT)
Dept: PULMONOLOGY | Age: 80
End: 2020-01-01

## 2020-01-01 ENCOUNTER — HOSPITAL ENCOUNTER (INPATIENT)
Age: 80
LOS: 3 days | Discharge: HOME | DRG: 180 | End: 2020-04-22
Attending: EMERGENCY MEDICINE | Admitting: INTERNAL MEDICINE
Payer: MEDICARE

## 2020-01-01 ENCOUNTER — OFFICE VISIT (OUTPATIENT)
Dept: PULMONOLOGY | Age: 80
End: 2020-01-01
Payer: MEDICARE

## 2020-01-01 ENCOUNTER — ANESTHESIA EVENT (OUTPATIENT)
Dept: OPERATING ROOM | Age: 80
End: 2020-01-01
Payer: MEDICARE

## 2020-01-01 ENCOUNTER — PREP FOR PROCEDURE (OUTPATIENT)
Dept: VASCULAR SURGERY | Age: 80
End: 2020-01-01

## 2020-01-01 ENCOUNTER — APPOINTMENT (OUTPATIENT)
Dept: CT IMAGING | Age: 80
DRG: 180 | End: 2020-01-01
Payer: MEDICARE

## 2020-01-01 ENCOUNTER — HOSPITAL ENCOUNTER (OUTPATIENT)
Age: 80
Setting detail: OUTPATIENT SURGERY
Discharge: HOME | End: 2020-01-23
Attending: INTERNAL MEDICINE | Admitting: INTERNAL MEDICINE
Payer: MEDICARE

## 2020-01-01 ENCOUNTER — ANESTHESIA EVENT (OUTPATIENT)
Dept: ENDOSCOPY | Age: 80
End: 2020-01-01
Payer: MEDICARE

## 2020-01-01 ENCOUNTER — ANESTHESIA (OUTPATIENT)
Dept: OPERATING ROOM | Age: 80
End: 2020-01-01
Payer: MEDICARE

## 2020-01-01 ENCOUNTER — ANESTHESIA (OUTPATIENT)
Dept: ENDOSCOPY | Age: 80
End: 2020-01-01
Payer: MEDICARE

## 2020-01-01 ENCOUNTER — TELEPHONE (OUTPATIENT)
Dept: VASCULAR SURGERY | Age: 80
End: 2020-01-01

## 2020-01-01 ENCOUNTER — OFFICE VISIT (OUTPATIENT)
Dept: VASCULAR SURGERY | Age: 80
End: 2020-01-01
Payer: MEDICARE

## 2020-01-01 ENCOUNTER — APPOINTMENT (OUTPATIENT)
Dept: GENERAL RADIOLOGY | Age: 80
End: 2020-01-01
Attending: SURGERY
Payer: MEDICARE

## 2020-01-01 ENCOUNTER — OFFICE VISIT (OUTPATIENT)
Dept: VASCULAR SURGERY | Age: 80
End: 2020-01-01

## 2020-01-01 ENCOUNTER — HOSPITAL ENCOUNTER (OUTPATIENT)
Age: 80
Setting detail: OUTPATIENT SURGERY
Discharge: HOME | End: 2020-03-06
Attending: SURGERY | Admitting: SURGERY
Payer: MEDICARE

## 2020-01-01 ENCOUNTER — APPOINTMENT (OUTPATIENT)
Dept: GENERAL RADIOLOGY | Age: 80
End: 2020-01-01
Attending: INTERNAL MEDICINE
Payer: MEDICARE

## 2020-01-01 ENCOUNTER — HOSPITAL ENCOUNTER (INPATIENT)
Age: 80
LOS: 3 days | Discharge: HOME | DRG: 177 | End: 2020-01-19
Attending: INTERNAL MEDICINE | Admitting: INTERNAL MEDICINE
Payer: MEDICARE

## 2020-01-01 VITALS
TEMPERATURE: 98.2 F | HEIGHT: 67 IN | WEIGHT: 127 LBS | HEART RATE: 80 BPM | BODY MASS INDEX: 19.93 KG/M2 | OXYGEN SATURATION: 97 % | RESPIRATION RATE: 18 BRPM | SYSTOLIC BLOOD PRESSURE: 124 MMHG | DIASTOLIC BLOOD PRESSURE: 58 MMHG

## 2020-01-01 VITALS
BODY MASS INDEX: 20.4 KG/M2 | SYSTOLIC BLOOD PRESSURE: 110 MMHG | WEIGHT: 130 LBS | DIASTOLIC BLOOD PRESSURE: 70 MMHG | HEIGHT: 67 IN | HEART RATE: 76 BPM | RESPIRATION RATE: 16 BRPM

## 2020-01-01 VITALS
OXYGEN SATURATION: 99 % | HEIGHT: 67 IN | BODY MASS INDEX: 18.52 KG/M2 | WEIGHT: 118 LBS | SYSTOLIC BLOOD PRESSURE: 135 MMHG | DIASTOLIC BLOOD PRESSURE: 79 MMHG | TEMPERATURE: 97.2 F | RESPIRATION RATE: 18 BRPM | HEART RATE: 85 BPM

## 2020-01-01 VITALS
HEART RATE: 49 BPM | HEIGHT: 67 IN | TEMPERATURE: 97.3 F | OXYGEN SATURATION: 98 % | SYSTOLIC BLOOD PRESSURE: 117 MMHG | BODY MASS INDEX: 20.4 KG/M2 | DIASTOLIC BLOOD PRESSURE: 61 MMHG | RESPIRATION RATE: 16 BRPM | WEIGHT: 130 LBS

## 2020-01-01 VITALS
WEIGHT: 139 LBS | TEMPERATURE: 98 F | OXYGEN SATURATION: 96 % | DIASTOLIC BLOOD PRESSURE: 57 MMHG | RESPIRATION RATE: 20 BRPM | BODY MASS INDEX: 21.82 KG/M2 | HEART RATE: 68 BPM | HEIGHT: 67 IN | SYSTOLIC BLOOD PRESSURE: 105 MMHG

## 2020-01-01 VITALS — OXYGEN SATURATION: 98 % | DIASTOLIC BLOOD PRESSURE: 65 MMHG | SYSTOLIC BLOOD PRESSURE: 125 MMHG

## 2020-01-01 VITALS
RESPIRATION RATE: 18 BRPM | SYSTOLIC BLOOD PRESSURE: 111 MMHG | BODY MASS INDEX: 21.8 KG/M2 | HEART RATE: 72 BPM | WEIGHT: 138.89 LBS | HEIGHT: 67 IN | DIASTOLIC BLOOD PRESSURE: 58 MMHG | TEMPERATURE: 99 F | OXYGEN SATURATION: 97 %

## 2020-01-01 VITALS
SYSTOLIC BLOOD PRESSURE: 117 MMHG | DIASTOLIC BLOOD PRESSURE: 62 MMHG | OXYGEN SATURATION: 100 % | RESPIRATION RATE: 32 BRPM

## 2020-01-01 VITALS
DIASTOLIC BLOOD PRESSURE: 78 MMHG | RESPIRATION RATE: 16 BRPM | HEART RATE: 78 BPM | BODY MASS INDEX: 20.09 KG/M2 | WEIGHT: 128 LBS | HEIGHT: 67 IN | SYSTOLIC BLOOD PRESSURE: 130 MMHG

## 2020-01-01 VITALS
OXYGEN SATURATION: 94 % | BODY MASS INDEX: 20.41 KG/M2 | HEART RATE: 82 BPM | WEIGHT: 127 LBS | RESPIRATION RATE: 18 BRPM | HEIGHT: 66 IN

## 2020-01-01 LAB
ABO/RH: NORMAL
ABO/RH: NORMAL
ACANTHOCYTES: ABNORMAL
ADDENDUM ELECTROPHORESIS URINE RANDOM: NORMAL
ALBUMIN SERPL-MCNC: 2.8 G/DL (ref 3.5–4.7)
ALBUMIN SERPL-MCNC: 3.1 G/DL (ref 3.5–5.2)
ALBUMIN SERPL-MCNC: 3.4 G/DL (ref 3.5–5.2)
ALP BLD-CCNC: 157 U/L (ref 40–129)
ALP BLD-CCNC: 99 U/L (ref 40–129)
ALPHA-1-GLOBULIN: 0.5 G/DL (ref 0.2–0.4)
ALPHA-2-GLOBULIN: 1.3 G/FL (ref 0.5–1)
ALT SERPL-CCNC: 19 U/L (ref 0–40)
ALT SERPL-CCNC: 25 U/L (ref 0–40)
ANION GAP SERPL CALCULATED.3IONS-SCNC: 11 MMOL/L (ref 7–16)
ANION GAP SERPL CALCULATED.3IONS-SCNC: 12 MMOL/L (ref 7–16)
ANION GAP SERPL CALCULATED.3IONS-SCNC: 12 MMOL/L (ref 7–16)
ANION GAP SERPL CALCULATED.3IONS-SCNC: 13 MMOL/L (ref 7–16)
ANION GAP SERPL CALCULATED.3IONS-SCNC: 14 MMOL/L (ref 7–16)
ANION GAP SERPL CALCULATED.3IONS-SCNC: 18 MMOL/L (ref 7–16)
ANION GAP SERPL CALCULATED.3IONS-SCNC: 9 MMOL/L (ref 7–16)
ANISOCYTOSIS: ABNORMAL
ANTIBODY SCREEN: NORMAL
ANTIBODY SCREEN: NORMAL
APTT: 30 SEC (ref 24.5–35.1)
APTT: 33.2 SEC (ref 24.5–35.1)
AST SERPL-CCNC: 18 U/L (ref 0–39)
AST SERPL-CCNC: 69 U/L (ref 0–39)
ATYPICAL LYMPHOCYTE RELATIVE PERCENT: 0.9 % (ref 0–4)
B.E.: -4.3 MMOL/L (ref -3–3)
BASOPHILS ABSOLUTE: 0 E9/L (ref 0–0.2)
BASOPHILS ABSOLUTE: 0.01 E9/L (ref 0–0.2)
BASOPHILS ABSOLUTE: 0.02 E9/L (ref 0–0.2)
BASOPHILS RELATIVE PERCENT: 0.2 % (ref 0–2)
BASOPHILS RELATIVE PERCENT: 0.3 % (ref 0–2)
BASOPHILS RELATIVE PERCENT: 0.5 % (ref 0–2)
BASOPHILS RELATIVE PERCENT: 0.5 % (ref 0–2)
BASOPHILS RELATIVE PERCENT: 0.9 % (ref 0–2)
BETA GLOBULIN: 1 G/DL (ref 0.8–1.3)
BILIRUB SERPL-MCNC: 0.9 MG/DL (ref 0–1.2)
BILIRUB SERPL-MCNC: 1.1 MG/DL (ref 0–1.2)
BLOOD CULTURE, ROUTINE: NORMAL
BUN BLDV-MCNC: 17 MG/DL (ref 8–23)
BUN BLDV-MCNC: 18 MG/DL (ref 8–23)
BUN BLDV-MCNC: 21 MG/DL (ref 8–23)
BUN BLDV-MCNC: 27 MG/DL (ref 8–23)
BUN BLDV-MCNC: 30 MG/DL (ref 8–23)
BUN BLDV-MCNC: 32 MG/DL (ref 8–23)
BUN BLDV-MCNC: 33 MG/DL (ref 8–23)
BURR CELLS: ABNORMAL
BURR CELLS: ABNORMAL
C-REACTIVE PROTEIN: 13.8 MG/DL (ref 0–0.4)
C-REACTIVE PROTEIN: 4.5 MG/DL (ref 0–0.4)
CALCIUM SERPL-MCNC: 10.6 MG/DL (ref 8.6–10.2)
CALCIUM SERPL-MCNC: 6.7 MG/DL (ref 8.6–10.2)
CALCIUM SERPL-MCNC: 7.7 MG/DL (ref 8.6–10.2)
CALCIUM SERPL-MCNC: 7.9 MG/DL (ref 8.6–10.2)
CALCIUM SERPL-MCNC: 8.2 MG/DL (ref 8.6–10.2)
CALCIUM SERPL-MCNC: 8.6 MG/DL (ref 8.6–10.2)
CALCIUM SERPL-MCNC: 8.9 MG/DL (ref 8.6–10.2)
CHLORIDE BLD-SCNC: 100 MMOL/L (ref 98–107)
CHLORIDE BLD-SCNC: 102 MMOL/L (ref 98–107)
CHLORIDE BLD-SCNC: 103 MMOL/L (ref 98–107)
CHLORIDE BLD-SCNC: 106 MMOL/L (ref 98–107)
CHLORIDE BLD-SCNC: 108 MMOL/L (ref 98–107)
CHLORIDE BLD-SCNC: 109 MMOL/L (ref 98–107)
CHLORIDE BLD-SCNC: 110 MMOL/L (ref 98–107)
CO2: 19 MMOL/L (ref 22–29)
CO2: 21 MMOL/L (ref 22–29)
CO2: 22 MMOL/L (ref 22–29)
CO2: 23 MMOL/L (ref 22–29)
CO2: 23 MMOL/L (ref 22–29)
CO2: 25 MMOL/L (ref 22–29)
CO2: 25 MMOL/L (ref 22–29)
COHB: 0.3 % (ref 0–1.5)
CREAT SERPL-MCNC: 0.8 MG/DL (ref 0.7–1.2)
CREAT SERPL-MCNC: 0.8 MG/DL (ref 0.7–1.2)
CREAT SERPL-MCNC: 0.9 MG/DL (ref 0.7–1.2)
CREAT SERPL-MCNC: 1 MG/DL (ref 0.7–1.2)
CREAT SERPL-MCNC: 1.2 MG/DL (ref 0.7–1.2)
CREAT SERPL-MCNC: 1.2 MG/DL (ref 0.7–1.2)
CREAT SERPL-MCNC: 1.6 MG/DL (ref 0.7–1.2)
CRITICAL: ABNORMAL
CULTURE, BLOOD 2: NORMAL
DAT POLYSPECIFIC: NORMAL
DATE ANALYZED: ABNORMAL
DATE OF COLLECTION: ABNORMAL
EKG ATRIAL RATE: 120 BPM
EKG Q-T INTERVAL: 384 MS
EKG QRS DURATION: 66 MS
EKG QTC CALCULATION (BAZETT): 453 MS
EKG R AXIS: 69 DEGREES
EKG T AXIS: 88 DEGREES
EKG VENTRICULAR RATE: 84 BPM
ELECTROPHORESIS: ABNORMAL
EOSINOPHILS ABSOLUTE: 0 E9/L (ref 0.05–0.5)
EOSINOPHILS ABSOLUTE: 0.02 E9/L (ref 0.05–0.5)
EOSINOPHILS ABSOLUTE: 0.04 E9/L (ref 0.05–0.5)
EOSINOPHILS ABSOLUTE: 0.07 E9/L (ref 0.05–0.5)
EOSINOPHILS ABSOLUTE: 0.16 E9/L (ref 0.05–0.5)
EOSINOPHILS RELATIVE PERCENT: 0 % (ref 0–6)
EOSINOPHILS RELATIVE PERCENT: 0.9 % (ref 0–6)
EOSINOPHILS RELATIVE PERCENT: 0.9 % (ref 0–6)
EOSINOPHILS RELATIVE PERCENT: 1.7 % (ref 0–6)
EOSINOPHILS RELATIVE PERCENT: 3.5 % (ref 0–6)
FERRITIN: 331 NG/ML
FOLATE: 7.6 NG/ML (ref 4.8–24.2)
GAMMA GLOBULIN: 0.9 G/DL (ref 0.7–1.6)
GFR AFRICAN AMERICAN: 50
GFR AFRICAN AMERICAN: >60
GFR NON-AFRICAN AMERICAN: 42 ML/MIN/1.73
GFR NON-AFRICAN AMERICAN: 58 ML/MIN/1.73
GFR NON-AFRICAN AMERICAN: 58 ML/MIN/1.73
GFR NON-AFRICAN AMERICAN: >60 ML/MIN/1.73
GLUCOSE BLD-MCNC: 100 MG/DL (ref 74–99)
GLUCOSE BLD-MCNC: 102 MG/DL (ref 74–99)
GLUCOSE BLD-MCNC: 105 MG/DL (ref 74–99)
GLUCOSE BLD-MCNC: 118 MG/DL (ref 74–99)
GLUCOSE BLD-MCNC: 129 MG/DL (ref 74–99)
GLUCOSE BLD-MCNC: 97 MG/DL (ref 74–99)
GLUCOSE BLD-MCNC: 97 MG/DL (ref 74–99)
HAPTOGLOBIN: 330 MG/DL (ref 30–200)
HCO3: 18.4 MMOL/L (ref 22–26)
HCT VFR BLD CALC: 24.1 % (ref 37–54)
HCT VFR BLD CALC: 26 % (ref 37–54)
HCT VFR BLD CALC: 26.8 % (ref 37–54)
HCT VFR BLD CALC: 29.2 % (ref 37–54)
HCT VFR BLD CALC: 29.4 % (ref 37–54)
HCT VFR BLD CALC: 30.5 % (ref 37–54)
HCT VFR BLD CALC: 30.7 % (ref 37–54)
HCT VFR BLD CALC: 30.7 % (ref 37–54)
HCT VFR BLD CALC: 31.6 % (ref 37–54)
HCT VFR BLD CALC: 32.5 % (ref 37–54)
HCT VFR BLD CALC: 32.8 % (ref 37–54)
HEMOGLOBIN: 7.2 G/DL (ref 12.5–16.5)
HEMOGLOBIN: 7.6 G/DL (ref 12.5–16.5)
HEMOGLOBIN: 7.7 G/DL (ref 12.5–16.5)
HEMOGLOBIN: 8.3 G/DL (ref 12.5–16.5)
HEMOGLOBIN: 8.5 G/DL (ref 12.5–16.5)
HEMOGLOBIN: 8.9 G/DL (ref 12.5–16.5)
HEMOGLOBIN: 9 G/DL (ref 12.5–16.5)
HEMOGLOBIN: 9.1 G/DL (ref 12.5–16.5)
HEMOGLOBIN: 9.5 G/DL (ref 12.5–16.5)
HEMOGLOBIN: 9.5 G/DL (ref 12.5–16.5)
HHB: 3.8 % (ref 0–5)
HYPOCHROMIA: ABNORMAL
HYPOCHROMIA: ABNORMAL
IGA: 140 MG/DL (ref 70–400)
IGG: 658 MG/DL (ref 700–1600)
IGM: 64 MG/DL (ref 40–230)
IMMATURE GRANULOCYTES #: 0.02 E9/L
IMMATURE GRANULOCYTES %: 0.9 % (ref 0–5)
IMMATURE RETIC FRACT: 12.4 % (ref 2.3–13.4)
IMMUNOFIXATION URINE: NORMAL
INR BLD: 1.1
INR BLD: 2.4
LAB: ABNORMAL
LACTATE DEHYDROGENASE: 250 U/L (ref 135–225)
LACTATE DEHYDROGENASE: 608 U/L (ref 135–225)
LACTIC ACID, SEPSIS: 1 MMOL/L (ref 0.5–1.9)
LACTIC ACID, SEPSIS: 3 MMOL/L (ref 0.5–1.9)
LYMPHOCYTES ABSOLUTE: 0.15 E9/L (ref 1.5–4)
LYMPHOCYTES ABSOLUTE: 0.17 E9/L (ref 1.5–4)
LYMPHOCYTES ABSOLUTE: 0.29 E9/L (ref 1.5–4)
LYMPHOCYTES ABSOLUTE: 0.35 E9/L (ref 1.5–4)
LYMPHOCYTES ABSOLUTE: 0.36 E9/L (ref 1.5–4)
LYMPHOCYTES RELATIVE PERCENT: 1.8 % (ref 20–42)
LYMPHOCYTES RELATIVE PERCENT: 13.2 % (ref 20–42)
LYMPHOCYTES RELATIVE PERCENT: 2.6 % (ref 20–42)
LYMPHOCYTES RELATIVE PERCENT: 7 % (ref 20–42)
LYMPHOCYTES RELATIVE PERCENT: 7.8 % (ref 20–42)
Lab: ABNORMAL
MAGNESIUM: 2.1 MG/DL (ref 1.6–2.6)
MCH RBC QN AUTO: 23.2 PG (ref 26–35)
MCH RBC QN AUTO: 23.4 PG (ref 26–35)
MCH RBC QN AUTO: 23.8 PG (ref 26–35)
MCH RBC QN AUTO: 24.1 PG (ref 26–35)
MCH RBC QN AUTO: 24.2 PG (ref 26–35)
MCH RBC QN AUTO: 24.5 PG (ref 26–35)
MCH RBC QN AUTO: 25.9 PG (ref 26–35)
MCH RBC QN AUTO: 26.4 PG (ref 26–35)
MCHC RBC AUTO-ENTMCNC: 28.4 % (ref 32–34.5)
MCHC RBC AUTO-ENTMCNC: 28.5 % (ref 32–34.5)
MCHC RBC AUTO-ENTMCNC: 28.9 % (ref 32–34.5)
MCHC RBC AUTO-ENTMCNC: 29 % (ref 32–34.5)
MCHC RBC AUTO-ENTMCNC: 29 % (ref 32–34.5)
MCHC RBC AUTO-ENTMCNC: 29.2 % (ref 32–34.5)
MCHC RBC AUTO-ENTMCNC: 29.6 % (ref 32–34.5)
MCHC RBC AUTO-ENTMCNC: 29.9 % (ref 32–34.5)
MCV RBC AUTO: 80.3 FL (ref 80–99.9)
MCV RBC AUTO: 81.3 FL (ref 80–99.9)
MCV RBC AUTO: 82.3 FL (ref 80–99.9)
MCV RBC AUTO: 83 FL (ref 80–99.9)
MCV RBC AUTO: 83.7 FL (ref 80–99.9)
MCV RBC AUTO: 85.9 FL (ref 80–99.9)
MCV RBC AUTO: 87.5 FL (ref 80–99.9)
MCV RBC AUTO: 88.3 FL (ref 80–99.9)
METAMYELOCYTES RELATIVE PERCENT: 0.9 % (ref 0–1)
METAMYELOCYTES RELATIVE PERCENT: 0.9 % (ref 0–1)
METAMYELOCYTES RELATIVE PERCENT: 1.7 % (ref 0–1)
METHB: 0.3 % (ref 0–1.5)
MODE: ABNORMAL
MONOCYTES ABSOLUTE: 0.06 E9/L (ref 0.1–0.95)
MONOCYTES ABSOLUTE: 0.11 E9/L (ref 0.1–0.95)
MONOCYTES ABSOLUTE: 0.46 E9/L (ref 0.1–0.95)
MONOCYTES ABSOLUTE: 0.55 E9/L (ref 0.1–0.95)
MONOCYTES ABSOLUTE: 0.57 E9/L (ref 0.1–0.95)
MONOCYTES RELATIVE PERCENT: 2.6 % (ref 2–12)
MONOCYTES RELATIVE PERCENT: 25.9 % (ref 2–12)
MONOCYTES RELATIVE PERCENT: 3.4 % (ref 2–12)
MONOCYTES RELATIVE PERCENT: 3.5 % (ref 2–12)
MONOCYTES RELATIVE PERCENT: 6.1 % (ref 2–12)
MRSA CULTURE ONLY: NORMAL
MYELOCYTE PERCENT: 1.7 % (ref 0–0)
NEUTROPHILS ABSOLUTE: 1.29 E9/L (ref 1.8–7.3)
NEUTROPHILS ABSOLUTE: 1.58 E9/L (ref 1.8–7.3)
NEUTROPHILS ABSOLUTE: 1.81 E9/L (ref 1.8–7.3)
NEUTROPHILS ABSOLUTE: 10.81 E9/L (ref 1.8–7.3)
NEUTROPHILS ABSOLUTE: 17.29 E9/L (ref 1.8–7.3)
NEUTROPHILS RELATIVE PERCENT: 58.6 % (ref 43–80)
NEUTROPHILS RELATIVE PERCENT: 80 % (ref 43–80)
NEUTROPHILS RELATIVE PERCENT: 84.5 % (ref 43–80)
NEUTROPHILS RELATIVE PERCENT: 93 % (ref 43–80)
NEUTROPHILS RELATIVE PERCENT: 94.7 % (ref 43–80)
NUCLEATED RED BLOOD CELLS: 0.9 /100 WBC
NUCLEATED RED BLOOD CELLS: 0.9 /100 WBC
O2 CONTENT: 13.8 ML/DL
O2 SATURATION: 96.2 % (ref 92–98.5)
O2HB: 95.6 % (ref 94–97)
OPERATOR ID: 359
OVALOCYTES: ABNORMAL
PATHOLOGIST REVIEW: NORMAL
PATIENT TEMP: 37 C
PCO2: 26.4 MMHG (ref 35–45)
PDW BLD-RTO: 16.2 FL (ref 11.5–15)
PDW BLD-RTO: 16.3 FL (ref 11.5–15)
PDW BLD-RTO: 16.4 FL (ref 11.5–15)
PDW BLD-RTO: 16.6 FL (ref 11.5–15)
PDW BLD-RTO: 16.7 FL (ref 11.5–15)
PDW BLD-RTO: 20.9 FL (ref 11.5–15)
PDW BLD-RTO: 21 FL (ref 11.5–15)
PDW BLD-RTO: 21 FL (ref 11.5–15)
PH BLOOD GAS: 7.46 (ref 7.35–7.45)
PLATELET # BLD: 122 E9/L (ref 130–450)
PLATELET # BLD: 180 E9/L (ref 130–450)
PLATELET # BLD: 228 E9/L (ref 130–450)
PLATELET # BLD: 262 E9/L (ref 130–450)
PLATELET # BLD: 270 E9/L (ref 130–450)
PLATELET # BLD: 308 E9/L (ref 130–450)
PLATELET # BLD: 380 E9/L (ref 130–450)
PLATELET # BLD: 403 E9/L (ref 130–450)
PMV BLD AUTO: 10 FL (ref 7–12)
PMV BLD AUTO: 10.2 FL (ref 7–12)
PMV BLD AUTO: 10.4 FL (ref 7–12)
PMV BLD AUTO: 10.4 FL (ref 7–12)
PMV BLD AUTO: 10.6 FL (ref 7–12)
PMV BLD AUTO: 10.7 FL (ref 7–12)
PMV BLD AUTO: 10.9 FL (ref 7–12)
PMV BLD AUTO: 9.9 FL (ref 7–12)
PO2: 85.8 MMHG (ref 75–100)
POIKILOCYTES: ABNORMAL
POLYCHROMASIA: ABNORMAL
POTASSIUM REFLEX MAGNESIUM: 4.1 MMOL/L (ref 3.5–5)
POTASSIUM REFLEX MAGNESIUM: 4.2 MMOL/L (ref 3.5–5)
POTASSIUM REFLEX MAGNESIUM: 4.8 MMOL/L (ref 3.5–5)
POTASSIUM REFLEX MAGNESIUM: 5.6 MMOL/L (ref 3.5–5)
POTASSIUM SERPL-SCNC: 4.2 MMOL/L (ref 3.5–5)
POTASSIUM SERPL-SCNC: 4.9 MMOL/L (ref 3.5–5)
POTASSIUM SERPL-SCNC: 5.5 MMOL/L (ref 3.5–5)
PRO-BNP: 2508 PG/ML (ref 0–450)
PROCALCITONIN: 0.2 NG/ML (ref 0–0.08)
PROCALCITONIN: 0.61 NG/ML (ref 0–0.08)
PROCALCITONIN: 0.84 NG/ML (ref 0–0.08)
PROSTATE SPECIFIC ANTIGEN: 3.97 NG/ML (ref 0–4)
PROTHROMBIN TIME: 12.6 SEC (ref 9.3–12.4)
PROTHROMBIN TIME: 27 SEC (ref 9.3–12.4)
RBC # BLD: 2.73 E12/L (ref 3.8–5.8)
RBC # BLD: 3.2 E12/L (ref 3.8–5.8)
RBC # BLD: 3.51 E12/L (ref 3.8–5.8)
RBC # BLD: 3.55 E12/L (ref 3.8–5.8)
RBC # BLD: 3.66 E12/L (ref 3.8–5.8)
RBC # BLD: 3.68 E12/L (ref 3.8–5.8)
RBC # BLD: 3.7 E12/L (ref 3.8–5.8)
RBC # BLD: 3.92 E12/L (ref 3.8–5.8)
RETIC HGB EQUIVALENT: 25.2 PG (ref 28.2–36.6)
RETICULOCYTE ABSOLUTE COUNT: 0.04 E12/L
RETICULOCYTE COUNT PCT: 1.2 % (ref 0.4–1.9)
SARS-COV-2, NAAT: NOT DETECTED
SCHISTOCYTES: ABNORMAL
SEDIMENTATION RATE, ERYTHROCYTE: 69 MM/HR (ref 0–15)
SODIUM BLD-SCNC: 137 MMOL/L (ref 132–146)
SODIUM BLD-SCNC: 137 MMOL/L (ref 132–146)
SODIUM BLD-SCNC: 141 MMOL/L (ref 132–146)
SODIUM BLD-SCNC: 142 MMOL/L (ref 132–146)
SODIUM BLD-SCNC: 142 MMOL/L (ref 132–146)
SODIUM BLD-SCNC: 143 MMOL/L (ref 132–146)
SODIUM BLD-SCNC: 143 MMOL/L (ref 132–146)
SOURCE, BLOOD GAS: ABNORMAL
TEAR DROP CELLS: ABNORMAL
THB: 10.2 G/DL (ref 11.5–16.5)
TIME ANALYZED: 1234
TOTAL PROTEIN: 5.5 G/DL (ref 6.4–8.3)
TOTAL PROTEIN: 6.4 G/DL (ref 6.4–8.3)
TOTAL PROTEIN: 6.4 G/DL (ref 6.4–8.3)
TOXIC GRANULATION: ABNORMAL
TROPONIN: 0.01 NG/ML (ref 0–0.03)
VITAMIN B-12: 1267 PG/ML (ref 211–946)
WBC # BLD: 1.9 E9/L (ref 4.5–11.5)
WBC # BLD: 11.5 E9/L (ref 4.5–11.5)
WBC # BLD: 13.2 E9/L (ref 4.5–11.5)
WBC # BLD: 18.2 E9/L (ref 4.5–11.5)
WBC # BLD: 2.1 E9/L (ref 4.5–11.5)
WBC # BLD: 2.2 E9/L (ref 4.5–11.5)
WBC # BLD: 6.8 E9/L (ref 4.5–11.5)
WBC # BLD: 9.2 E9/L (ref 4.5–11.5)

## 2020-01-01 PROCEDURE — 36415 COLL VENOUS BLD VENIPUNCTURE: CPT

## 2020-01-01 PROCEDURE — 83010 ASSAY OF HAPTOGLOBIN QUANT: CPT

## 2020-01-01 PROCEDURE — 85027 COMPLETE CBC AUTOMATED: CPT

## 2020-01-01 PROCEDURE — 84166 PROTEIN E-PHORESIS/URINE/CSF: CPT

## 2020-01-01 PROCEDURE — 3603165200 HC BRNCHSC EBUS GUIDED SAMPL 1/2 NODE STATION/STRUX: Performed by: INTERNAL MEDICINE

## 2020-01-01 PROCEDURE — 94640 AIRWAY INHALATION TREATMENT: CPT

## 2020-01-01 PROCEDURE — 85018 HEMOGLOBIN: CPT

## 2020-01-01 PROCEDURE — 83880 ASSAY OF NATRIURETIC PEPTIDE: CPT

## 2020-01-01 PROCEDURE — 71250 CT THORAX DX C-: CPT

## 2020-01-01 PROCEDURE — 6360000002 HC RX W HCPCS: Performed by: SURGERY

## 2020-01-01 PROCEDURE — 99233 SBSQ HOSP IP/OBS HIGH 50: CPT | Performed by: INTERNAL MEDICINE

## 2020-01-01 PROCEDURE — 86850 RBC ANTIBODY SCREEN: CPT

## 2020-01-01 PROCEDURE — 6370000000 HC RX 637 (ALT 250 FOR IP): Performed by: INTERNAL MEDICINE

## 2020-01-01 PROCEDURE — 99224 PR SBSQ OBSERVATION CARE/DAY 15 MINUTES: CPT | Performed by: INTERNAL MEDICINE

## 2020-01-01 PROCEDURE — 1111F DSCHRG MED/CURRENT MED MERGE: CPT | Performed by: SURGERY

## 2020-01-01 PROCEDURE — 6360000002 HC RX W HCPCS

## 2020-01-01 PROCEDURE — G0378 HOSPITAL OBSERVATION PER HR: HCPCS

## 2020-01-01 PROCEDURE — 71045 X-RAY EXAM CHEST 1 VIEW: CPT

## 2020-01-01 PROCEDURE — 7100000010 HC PHASE II RECOVERY - FIRST 15 MIN: Performed by: INTERNAL MEDICINE

## 2020-01-01 PROCEDURE — 80053 COMPREHEN METABOLIC PANEL: CPT

## 2020-01-01 PROCEDURE — 2500000003 HC RX 250 WO HCPCS: Performed by: NURSE ANESTHETIST, CERTIFIED REGISTERED

## 2020-01-01 PROCEDURE — 2580000003 HC RX 258: Performed by: EMERGENCY MEDICINE

## 2020-01-01 PROCEDURE — 82784 ASSAY IGA/IGD/IGG/IGM EACH: CPT

## 2020-01-01 PROCEDURE — 83735 ASSAY OF MAGNESIUM: CPT

## 2020-01-01 PROCEDURE — 2580000003 HC RX 258: Performed by: INTERNAL MEDICINE

## 2020-01-01 PROCEDURE — U0002 COVID-19 LAB TEST NON-CDC: HCPCS

## 2020-01-01 PROCEDURE — 83615 LACTATE (LD) (LDH) ENZYME: CPT

## 2020-01-01 PROCEDURE — 84145 PROCALCITONIN (PCT): CPT

## 2020-01-01 PROCEDURE — 93005 ELECTROCARDIOGRAM TRACING: CPT | Performed by: EMERGENCY MEDICINE

## 2020-01-01 PROCEDURE — 85730 THROMBOPLASTIN TIME PARTIAL: CPT

## 2020-01-01 PROCEDURE — 4040F PNEUMOC VAC/ADMIN/RCVD: CPT | Performed by: SURGERY

## 2020-01-01 PROCEDURE — 7100000010 HC PHASE II RECOVERY - FIRST 15 MIN: Performed by: SURGERY

## 2020-01-01 PROCEDURE — 6370000000 HC RX 637 (ALT 250 FOR IP): Performed by: CLINICAL NURSE SPECIALIST

## 2020-01-01 PROCEDURE — 2500000003 HC RX 250 WO HCPCS

## 2020-01-01 PROCEDURE — 6360000004 HC RX CONTRAST MEDICATION: Performed by: RADIOLOGY

## 2020-01-01 PROCEDURE — G8484 FLU IMMUNIZE NO ADMIN: HCPCS | Performed by: SURGERY

## 2020-01-01 PROCEDURE — 97161 PT EVAL LOW COMPLEX 20 MIN: CPT | Performed by: PHYSICAL THERAPIST

## 2020-01-01 PROCEDURE — 85610 PROTHROMBIN TIME: CPT

## 2020-01-01 PROCEDURE — 7100000000 HC PACU RECOVERY - FIRST 15 MIN: Performed by: INTERNAL MEDICINE

## 2020-01-01 PROCEDURE — 3700000000 HC ANESTHESIA ATTENDED CARE: Performed by: INTERNAL MEDICINE

## 2020-01-01 PROCEDURE — 99223 1ST HOSP IP/OBS HIGH 75: CPT | Performed by: INTERNAL MEDICINE

## 2020-01-01 PROCEDURE — 77001 FLUOROGUIDE FOR VEIN DEVICE: CPT | Performed by: SURGERY

## 2020-01-01 PROCEDURE — 99204 OFFICE O/P NEW MOD 45 MIN: CPT | Performed by: SURGERY

## 2020-01-01 PROCEDURE — 2709999900 HC NON-CHARGEABLE SUPPLY: Performed by: SURGERY

## 2020-01-01 PROCEDURE — 99225 PR SBSQ OBSERVATION CARE/DAY 25 MINUTES: CPT | Performed by: INTERNAL MEDICINE

## 2020-01-01 PROCEDURE — 82746 ASSAY OF FOLIC ACID SERUM: CPT

## 2020-01-01 PROCEDURE — 86900 BLOOD TYPING SEROLOGIC ABO: CPT

## 2020-01-01 PROCEDURE — 97165 OT EVAL LOW COMPLEX 30 MIN: CPT

## 2020-01-01 PROCEDURE — 97530 THERAPEUTIC ACTIVITIES: CPT

## 2020-01-01 PROCEDURE — 85651 RBC SED RATE NONAUTOMATED: CPT

## 2020-01-01 PROCEDURE — 31652 BRONCH EBUS SAMPLNG 1/2 NODE: CPT | Performed by: INTERNAL MEDICINE

## 2020-01-01 PROCEDURE — 99213 OFFICE O/P EST LOW 20 MIN: CPT | Performed by: INTERNAL MEDICINE

## 2020-01-01 PROCEDURE — 88305 TISSUE EXAM BY PATHOLOGIST: CPT

## 2020-01-01 PROCEDURE — 85025 COMPLETE CBC W/AUTO DIFF WBC: CPT

## 2020-01-01 PROCEDURE — 96365 THER/PROPH/DIAG IV INF INIT: CPT

## 2020-01-01 PROCEDURE — 6360000002 HC RX W HCPCS: Performed by: INTERNAL MEDICINE

## 2020-01-01 PROCEDURE — 1123F ACP DISCUSS/DSCN MKR DOCD: CPT | Performed by: SURGERY

## 2020-01-01 PROCEDURE — 3609020000 HC BRONCHOSCOPY W/EBUS FNA: Performed by: INTERNAL MEDICINE

## 2020-01-01 PROCEDURE — 97530 THERAPEUTIC ACTIVITIES: CPT | Performed by: PHYSICAL THERAPIST

## 2020-01-01 PROCEDURE — 3700000001 HC ADD 15 MINUTES (ANESTHESIA): Performed by: INTERNAL MEDICINE

## 2020-01-01 PROCEDURE — 87081 CULTURE SCREEN ONLY: CPT

## 2020-01-01 PROCEDURE — 85014 HEMATOCRIT: CPT

## 2020-01-01 PROCEDURE — 86140 C-REACTIVE PROTEIN: CPT

## 2020-01-01 PROCEDURE — 84165 PROTEIN E-PHORESIS SERUM: CPT

## 2020-01-01 PROCEDURE — 6360000002 HC RX W HCPCS: Performed by: EMERGENCY MEDICINE

## 2020-01-01 PROCEDURE — 86334 IMMUNOFIX E-PHORESIS SERUM: CPT

## 2020-01-01 PROCEDURE — 85045 AUTOMATED RETICULOCYTE COUNT: CPT

## 2020-01-01 PROCEDURE — 99220 PR INITIAL OBSERVATION CARE/DAY 70 MINUTES: CPT | Performed by: INTERNAL MEDICINE

## 2020-01-01 PROCEDURE — C1788 PORT, INDWELLING, IMP: HCPCS | Performed by: SURGERY

## 2020-01-01 PROCEDURE — C1725 CATH, TRANSLUMIN NON-LASER: HCPCS | Performed by: INTERNAL MEDICINE

## 2020-01-01 PROCEDURE — 93010 ELECTROCARDIOGRAM REPORT: CPT | Performed by: INTERNAL MEDICINE

## 2020-01-01 PROCEDURE — 99232 SBSQ HOSP IP/OBS MODERATE 35: CPT | Performed by: INTERNAL MEDICINE

## 2020-01-01 PROCEDURE — 2580000003 HC RX 258: Performed by: ANESTHESIOLOGIST ASSISTANT

## 2020-01-01 PROCEDURE — 80048 BASIC METABOLIC PNL TOTAL CA: CPT

## 2020-01-01 PROCEDURE — 2500000003 HC RX 250 WO HCPCS: Performed by: ANESTHESIOLOGIST ASSISTANT

## 2020-01-01 PROCEDURE — 3700000000 HC ANESTHESIA ATTENDED CARE: Performed by: SURGERY

## 2020-01-01 PROCEDURE — 83605 ASSAY OF LACTIC ACID: CPT

## 2020-01-01 PROCEDURE — 86901 BLOOD TYPING SEROLOGIC RH(D): CPT

## 2020-01-01 PROCEDURE — 1200000000 HC SEMI PRIVATE

## 2020-01-01 PROCEDURE — 7100000011 HC PHASE II RECOVERY - ADDTL 15 MIN: Performed by: INTERNAL MEDICINE

## 2020-01-01 PROCEDURE — 84484 ASSAY OF TROPONIN QUANT: CPT

## 2020-01-01 PROCEDURE — 88173 CYTOPATH EVAL FNA REPORT: CPT

## 2020-01-01 PROCEDURE — 7100000011 HC PHASE II RECOVERY - ADDTL 15 MIN: Performed by: SURGERY

## 2020-01-01 PROCEDURE — G8420 CALC BMI NORM PARAMETERS: HCPCS | Performed by: SURGERY

## 2020-01-01 PROCEDURE — 87040 BLOOD CULTURE FOR BACTERIA: CPT

## 2020-01-01 PROCEDURE — 2580000003 HC RX 258: Performed by: STUDENT IN AN ORGANIZED HEALTH CARE EDUCATION/TRAINING PROGRAM

## 2020-01-01 PROCEDURE — 1036F TOBACCO NON-USER: CPT | Performed by: SURGERY

## 2020-01-01 PROCEDURE — 2500000003 HC RX 250 WO HCPCS: Performed by: SURGERY

## 2020-01-01 PROCEDURE — 3700000001 HC ADD 15 MINUTES (ANESTHESIA): Performed by: SURGERY

## 2020-01-01 PROCEDURE — 2709999900 HC NON-CHARGEABLE SUPPLY: Performed by: INTERNAL MEDICINE

## 2020-01-01 PROCEDURE — 2580000003 HC RX 258: Performed by: SURGERY

## 2020-01-01 PROCEDURE — 36561 INSERT TUNNELED CV CATH: CPT | Performed by: SURGERY

## 2020-01-01 PROCEDURE — 71275 CT ANGIOGRAPHY CHEST: CPT

## 2020-01-01 PROCEDURE — G8427 DOCREV CUR MEDS BY ELIG CLIN: HCPCS | Performed by: SURGERY

## 2020-01-01 PROCEDURE — 3600000003 HC SURGERY LEVEL 3 BASE: Performed by: SURGERY

## 2020-01-01 PROCEDURE — 82805 BLOOD GASES W/O2 SATURATION: CPT

## 2020-01-01 PROCEDURE — 3600000013 HC SURGERY LEVEL 3 ADDTL 15MIN: Performed by: SURGERY

## 2020-01-01 PROCEDURE — 82607 VITAMIN B-12: CPT

## 2020-01-01 PROCEDURE — 2580000003 HC RX 258: Performed by: CLINICAL NURSE SPECIALIST

## 2020-01-01 PROCEDURE — 86880 COOMBS TEST DIRECT: CPT

## 2020-01-01 PROCEDURE — 3209999900 FLUORO FOR SURGICAL PROCEDURES

## 2020-01-01 PROCEDURE — 6360000002 HC RX W HCPCS: Performed by: ANESTHESIOLOGIST ASSISTANT

## 2020-01-01 PROCEDURE — 82728 ASSAY OF FERRITIN: CPT

## 2020-01-01 PROCEDURE — 2060000000 HC ICU INTERMEDIATE R&B

## 2020-01-01 PROCEDURE — 74177 CT ABD & PELVIS W/CONTRAST: CPT

## 2020-01-01 PROCEDURE — 6360000004 HC RX CONTRAST MEDICATION: Performed by: STUDENT IN AN ORGANIZED HEALTH CARE EDUCATION/TRAINING PROGRAM

## 2020-01-01 PROCEDURE — 99217 PR OBSERVATION CARE DISCHARGE MANAGEMENT: CPT | Performed by: INTERNAL MEDICINE

## 2020-01-01 PROCEDURE — 84153 ASSAY OF PSA TOTAL: CPT

## 2020-01-01 PROCEDURE — 99222 1ST HOSP IP/OBS MODERATE 55: CPT | Performed by: THORACIC SURGERY (CARDIOTHORACIC VASCULAR SURGERY)

## 2020-01-01 PROCEDURE — 6360000002 HC RX W HCPCS: Performed by: NURSE ANESTHETIST, CERTIFIED REGISTERED

## 2020-01-01 PROCEDURE — 99024 POSTOP FOLLOW-UP VISIT: CPT | Performed by: SURGERY

## 2020-01-01 PROCEDURE — 99285 EMERGENCY DEPT VISIT HI MDM: CPT

## 2020-01-01 PROCEDURE — 7100000001 HC PACU RECOVERY - ADDTL 15 MIN: Performed by: INTERNAL MEDICINE

## 2020-01-01 DEVICE — PORT INFUS 8FR PWR INJ CT FOR VASC ACCS CATH: Type: IMPLANTABLE DEVICE | Status: FUNCTIONAL

## 2020-01-01 RX ORDER — SODIUM CHLORIDE 0.9 % (FLUSH) 0.9 %
10 SYRINGE (ML) INJECTION
Status: COMPLETED | OUTPATIENT
Start: 2020-01-01 | End: 2020-01-01

## 2020-01-01 RX ORDER — PROCHLORPERAZINE MALEATE 10 MG
5 TABLET ORAL EVERY 6 HOURS PRN
Status: DISCONTINUED | OUTPATIENT
Start: 2020-01-01 | End: 2020-01-01 | Stop reason: HOSPADM

## 2020-01-01 RX ORDER — SODIUM CHLORIDE 0.9 % (FLUSH) 0.9 %
10 SYRINGE (ML) INJECTION EVERY 12 HOURS SCHEDULED
Status: DISCONTINUED | OUTPATIENT
Start: 2020-01-01 | End: 2020-01-01 | Stop reason: HOSPADM

## 2020-01-01 RX ORDER — PROPOFOL 10 MG/ML
INJECTION, EMULSION INTRAVENOUS PRN
Status: DISCONTINUED | OUTPATIENT
Start: 2020-01-01 | End: 2020-01-01

## 2020-01-01 RX ORDER — 0.9 % SODIUM CHLORIDE 0.9 %
1000 INTRAVENOUS SOLUTION INTRAVENOUS ONCE
Status: COMPLETED | OUTPATIENT
Start: 2020-01-01 | End: 2020-01-01

## 2020-01-01 RX ORDER — PROPOFOL 10 MG/ML
INJECTION, EMULSION INTRAVENOUS CONTINUOUS PRN
Status: DISCONTINUED | OUTPATIENT
Start: 2020-01-01 | End: 2020-01-01 | Stop reason: SDUPTHER

## 2020-01-01 RX ORDER — FERROUS SULFATE 325(65) MG
325 TABLET ORAL
Status: DISCONTINUED | OUTPATIENT
Start: 2020-01-01 | End: 2020-01-01 | Stop reason: HOSPADM

## 2020-01-01 RX ORDER — DEXAMETHASONE SODIUM PHOSPHATE 10 MG/ML
INJECTION INTRAMUSCULAR; INTRAVENOUS PRN
Status: DISCONTINUED | OUTPATIENT
Start: 2020-01-01 | End: 2020-01-01 | Stop reason: SDUPTHER

## 2020-01-01 RX ORDER — ACETAMINOPHEN 325 MG/1
650 TABLET ORAL EVERY 4 HOURS PRN
Status: DISCONTINUED | OUTPATIENT
Start: 2020-01-01 | End: 2020-01-01 | Stop reason: HOSPADM

## 2020-01-01 RX ORDER — CEFDINIR 300 MG/1
300 CAPSULE ORAL 2 TIMES DAILY
Qty: 10 CAPSULE | Refills: 0 | Status: SHIPPED | OUTPATIENT
Start: 2020-01-01 | End: 2020-01-01

## 2020-01-01 RX ORDER — CLOPIDOGREL BISULFATE 75 MG/1
75 TABLET ORAL DAILY
Status: DISCONTINUED | OUTPATIENT
Start: 2020-01-01 | End: 2020-01-01 | Stop reason: HOSPADM

## 2020-01-01 RX ORDER — SODIUM CHLORIDE 9 MG/ML
INJECTION, SOLUTION INTRAVENOUS CONTINUOUS
Status: DISCONTINUED | OUTPATIENT
Start: 2020-01-01 | End: 2020-01-01

## 2020-01-01 RX ORDER — OXYCODONE HYDROCHLORIDE AND ACETAMINOPHEN 5; 325 MG/1; MG/1
1 TABLET ORAL EVERY 6 HOURS PRN
Status: DISCONTINUED | OUTPATIENT
Start: 2020-01-01 | End: 2020-01-01 | Stop reason: HOSPADM

## 2020-01-01 RX ORDER — NIFEDIPINE 30 MG/1
30 TABLET, EXTENDED RELEASE ORAL DAILY
Status: DISCONTINUED | OUTPATIENT
Start: 2020-01-01 | End: 2020-01-01 | Stop reason: HOSPADM

## 2020-01-01 RX ORDER — PROCHLORPERAZINE MALEATE 10 MG
TABLET ORAL
Status: ON HOLD | COMMUNITY
Start: 2020-01-01 | End: 2020-01-01

## 2020-01-01 RX ORDER — SODIUM CHLORIDE 0.9 % (FLUSH) 0.9 %
10 SYRINGE (ML) INJECTION EVERY 12 HOURS SCHEDULED
Status: CANCELLED | OUTPATIENT
Start: 2020-01-01

## 2020-01-01 RX ORDER — SODIUM CHLORIDE 0.9 % (FLUSH) 0.9 %
10 SYRINGE (ML) INJECTION
Status: DISPENSED | OUTPATIENT
Start: 2020-01-01 | End: 2020-01-01

## 2020-01-01 RX ORDER — PREDNISONE 20 MG/1
20 TABLET ORAL DAILY
Qty: 7 TABLET | Refills: 0 | Status: SHIPPED | OUTPATIENT
Start: 2020-01-01 | End: 2020-01-01

## 2020-01-01 RX ORDER — SODIUM CHLORIDE 9 MG/ML
INJECTION, SOLUTION INTRAVENOUS CONTINUOUS
Status: CANCELLED | OUTPATIENT
Start: 2020-01-01

## 2020-01-01 RX ORDER — ATORVASTATIN CALCIUM 20 MG/1
20 TABLET, FILM COATED ORAL DAILY
Status: DISCONTINUED | OUTPATIENT
Start: 2020-01-01 | End: 2020-01-01 | Stop reason: HOSPADM

## 2020-01-01 RX ORDER — SODIUM CHLORIDE, SODIUM LACTATE, POTASSIUM CHLORIDE, CALCIUM CHLORIDE 600; 310; 30; 20 MG/100ML; MG/100ML; MG/100ML; MG/100ML
INJECTION, SOLUTION INTRAVENOUS CONTINUOUS
Status: DISCONTINUED | OUTPATIENT
Start: 2020-01-01 | End: 2020-01-01 | Stop reason: HOSPADM

## 2020-01-01 RX ORDER — ACETAMINOPHEN 325 MG/1
650 TABLET ORAL EVERY 6 HOURS PRN
Status: ON HOLD | COMMUNITY
End: 2020-01-01

## 2020-01-01 RX ORDER — HYDROMORPHONE HYDROCHLORIDE 2 MG/1
2 TABLET ORAL EVERY 4 HOURS
COMMUNITY

## 2020-01-01 RX ORDER — IPRATROPIUM BROMIDE AND ALBUTEROL SULFATE 2.5; .5 MG/3ML; MG/3ML
1 SOLUTION RESPIRATORY (INHALATION)
Status: DISCONTINUED | OUTPATIENT
Start: 2020-01-01 | End: 2020-01-01 | Stop reason: HOSPADM

## 2020-01-01 RX ORDER — CLOPIDOGREL BISULFATE 75 MG/1
75 TABLET ORAL DAILY
Status: DISCONTINUED | OUTPATIENT
Start: 2020-01-01 | End: 2020-01-01

## 2020-01-01 RX ORDER — MIRTAZAPINE 15 MG/1
15 TABLET, FILM COATED ORAL NIGHTLY
Status: DISCONTINUED | OUTPATIENT
Start: 2020-01-01 | End: 2020-01-01 | Stop reason: HOSPADM

## 2020-01-01 RX ORDER — ATORVASTATIN CALCIUM 10 MG/1
20 TABLET, FILM COATED ORAL DAILY
Status: DISCONTINUED | OUTPATIENT
Start: 2020-01-01 | End: 2020-01-01 | Stop reason: HOSPADM

## 2020-01-01 RX ORDER — LIDOCAINE HYDROCHLORIDE 20 MG/ML
INJECTION, SOLUTION INTRAVENOUS PRN
Status: DISCONTINUED | OUTPATIENT
Start: 2020-01-01 | End: 2020-01-01 | Stop reason: SDUPTHER

## 2020-01-01 RX ORDER — SODIUM CHLORIDE 9 MG/ML
INJECTION, SOLUTION INTRAVENOUS CONTINUOUS PRN
Status: DISCONTINUED | OUTPATIENT
Start: 2020-01-01 | End: 2020-01-01 | Stop reason: SDUPTHER

## 2020-01-01 RX ORDER — CEFDINIR 300 MG/1
300 CAPSULE ORAL 2 TIMES DAILY
Qty: 14 CAPSULE | Refills: 0 | Status: SHIPPED | OUTPATIENT
Start: 2020-01-01 | End: 2020-01-01

## 2020-01-01 RX ORDER — DEXTROSE AND SODIUM CHLORIDE 5; .45 G/100ML; G/100ML
INJECTION, SOLUTION INTRAVENOUS CONTINUOUS
Status: DISCONTINUED | OUTPATIENT
Start: 2020-01-01 | End: 2020-01-01

## 2020-01-01 RX ORDER — OXYCODONE HYDROCHLORIDE AND ACETAMINOPHEN 5; 325 MG/1; MG/1
TABLET ORAL
Status: ON HOLD | COMMUNITY
Start: 2020-01-01 | End: 2020-01-01

## 2020-01-01 RX ORDER — ROCURONIUM BROMIDE 10 MG/ML
INJECTION, SOLUTION INTRAVENOUS PRN
Status: DISCONTINUED | OUTPATIENT
Start: 2020-01-01 | End: 2020-01-01 | Stop reason: SDUPTHER

## 2020-01-01 RX ORDER — LANOLIN ALCOHOL/MO/W.PET/CERES
325 CREAM (GRAM) TOPICAL
COMMUNITY

## 2020-01-01 RX ORDER — NIFEDIPINE 30 MG/1
15 TABLET, FILM COATED, EXTENDED RELEASE ORAL DAILY
COMMUNITY

## 2020-01-01 RX ORDER — LABETALOL HYDROCHLORIDE 5 MG/ML
5 INJECTION, SOLUTION INTRAVENOUS EVERY 10 MIN PRN
Status: DISCONTINUED | OUTPATIENT
Start: 2020-01-01 | End: 2020-01-01 | Stop reason: HOSPADM

## 2020-01-01 RX ORDER — SODIUM CHLORIDE 0.9 % (FLUSH) 0.9 %
10 SYRINGE (ML) INJECTION EVERY 12 HOURS SCHEDULED
Status: DISCONTINUED | OUTPATIENT
Start: 2020-01-01 | End: 2020-01-01 | Stop reason: SDUPTHER

## 2020-01-01 RX ORDER — SUCRALFATE 1 G/1
1 TABLET ORAL 2 TIMES DAILY
Status: DISCONTINUED | OUTPATIENT
Start: 2020-01-01 | End: 2020-01-01 | Stop reason: HOSPADM

## 2020-01-01 RX ORDER — MEPERIDINE HYDROCHLORIDE 50 MG/ML
12.5 INJECTION INTRAMUSCULAR; INTRAVENOUS; SUBCUTANEOUS EVERY 5 MIN PRN
Status: DISCONTINUED | OUTPATIENT
Start: 2020-01-01 | End: 2020-01-01 | Stop reason: HOSPADM

## 2020-01-01 RX ORDER — LOSARTAN POTASSIUM 25 MG/1
25 TABLET ORAL DAILY
Status: DISCONTINUED | OUTPATIENT
Start: 2020-01-01 | End: 2020-01-01 | Stop reason: HOSPADM

## 2020-01-01 RX ORDER — ACETAMINOPHEN 325 MG/1
650 TABLET ORAL EVERY 6 HOURS PRN
Status: DISCONTINUED | OUTPATIENT
Start: 2020-01-01 | End: 2020-01-01 | Stop reason: HOSPADM

## 2020-01-01 RX ORDER — SODIUM CHLORIDE 9 MG/ML
INJECTION, SOLUTION INTRAVENOUS CONTINUOUS
Status: DISCONTINUED | OUTPATIENT
Start: 2020-01-01 | End: 2020-01-01 | Stop reason: HOSPADM

## 2020-01-01 RX ORDER — GLYCOPYRROLATE 1 MG/5 ML
SYRINGE (ML) INTRAVENOUS PRN
Status: DISCONTINUED | OUTPATIENT
Start: 2020-01-01 | End: 2020-01-01 | Stop reason: SDUPTHER

## 2020-01-01 RX ORDER — ALLOPURINOL 300 MG/1
150 TABLET ORAL DAILY PRN
Status: DISCONTINUED | OUTPATIENT
Start: 2020-01-01 | End: 2020-01-01 | Stop reason: HOSPADM

## 2020-01-01 RX ORDER — SODIUM CHLORIDE 0.9 % (FLUSH) 0.9 %
10 SYRINGE (ML) INJECTION PRN
Status: DISCONTINUED | OUTPATIENT
Start: 2020-01-01 | End: 2020-01-01 | Stop reason: SDUPTHER

## 2020-01-01 RX ORDER — FENTANYL CITRATE 50 UG/ML
INJECTION, SOLUTION INTRAMUSCULAR; INTRAVENOUS PRN
Status: DISCONTINUED | OUTPATIENT
Start: 2020-01-01 | End: 2020-01-01 | Stop reason: SDUPTHER

## 2020-01-01 RX ORDER — ALLOPURINOL 300 MG/1
150 TABLET ORAL DAILY
Status: DISCONTINUED | OUTPATIENT
Start: 2020-01-01 | End: 2020-01-01 | Stop reason: HOSPADM

## 2020-01-01 RX ORDER — HYDROMORPHONE HYDROCHLORIDE 2 MG/1
2 TABLET ORAL EVERY 4 HOURS
Status: DISCONTINUED | OUTPATIENT
Start: 2020-01-01 | End: 2020-01-01 | Stop reason: HOSPADM

## 2020-01-01 RX ORDER — MEGESTROL ACETATE 40 MG/ML
400 SUSPENSION ORAL DAILY
COMMUNITY

## 2020-01-01 RX ORDER — SODIUM CHLORIDE 0.9 % (FLUSH) 0.9 %
10 SYRINGE (ML) INJECTION PRN
Status: DISCONTINUED | OUTPATIENT
Start: 2020-01-01 | End: 2020-01-01 | Stop reason: HOSPADM

## 2020-01-01 RX ORDER — PROMETHAZINE HYDROCHLORIDE 25 MG/ML
25 INJECTION, SOLUTION INTRAMUSCULAR; INTRAVENOUS PRN
Status: DISCONTINUED | OUTPATIENT
Start: 2020-01-01 | End: 2020-01-01 | Stop reason: HOSPADM

## 2020-01-01 RX ORDER — PROMETHAZINE HYDROCHLORIDE 25 MG/1
12.5 TABLET ORAL EVERY 6 HOURS PRN
Status: DISCONTINUED | OUTPATIENT
Start: 2020-01-01 | End: 2020-01-01 | Stop reason: HOSPADM

## 2020-01-01 RX ORDER — POLYETHYLENE GLYCOL 3350 17 G/17G
17 POWDER, FOR SOLUTION ORAL DAILY PRN
Status: DISCONTINUED | OUTPATIENT
Start: 2020-01-01 | End: 2020-01-01 | Stop reason: HOSPADM

## 2020-01-01 RX ORDER — LIDOCAINE HYDROCHLORIDE 10 MG/ML
INJECTION, SOLUTION INFILTRATION; PERINEURAL PRN
Status: DISCONTINUED | OUTPATIENT
Start: 2020-01-01 | End: 2020-01-01 | Stop reason: ALTCHOICE

## 2020-01-01 RX ORDER — PROPOFOL 10 MG/ML
INJECTION, EMULSION INTRAVENOUS PRN
Status: DISCONTINUED | OUTPATIENT
Start: 2020-01-01 | End: 2020-01-01 | Stop reason: SDUPTHER

## 2020-01-01 RX ORDER — ONDANSETRON 2 MG/ML
4 INJECTION INTRAMUSCULAR; INTRAVENOUS EVERY 6 HOURS PRN
Status: DISCONTINUED | OUTPATIENT
Start: 2020-01-01 | End: 2020-01-01 | Stop reason: HOSPADM

## 2020-01-01 RX ORDER — SODIUM CHLORIDE 0.9 % (FLUSH) 0.9 %
10 SYRINGE (ML) INJECTION PRN
Status: CANCELLED | OUTPATIENT
Start: 2020-01-01

## 2020-01-01 RX ORDER — CEFAZOLIN SODIUM 2 G/50ML
2 SOLUTION INTRAVENOUS
Status: COMPLETED | OUTPATIENT
Start: 2020-01-01 | End: 2020-01-01

## 2020-01-01 RX ORDER — ALLOPURINOL 300 MG/1
150 TABLET ORAL DAILY
Qty: 30 TABLET | Refills: 3 | Status: SHIPPED | OUTPATIENT
Start: 2020-01-01

## 2020-01-01 RX ORDER — CLOPIDOGREL BISULFATE 75 MG/1
TABLET ORAL
COMMUNITY

## 2020-01-01 RX ORDER — ONDANSETRON 2 MG/ML
INJECTION INTRAMUSCULAR; INTRAVENOUS PRN
Status: DISCONTINUED | OUTPATIENT
Start: 2020-01-01 | End: 2020-01-01 | Stop reason: SDUPTHER

## 2020-01-01 RX ORDER — ACETAMINOPHEN 650 MG/1
650 SUPPOSITORY RECTAL EVERY 6 HOURS PRN
Status: DISCONTINUED | OUTPATIENT
Start: 2020-01-01 | End: 2020-01-01 | Stop reason: HOSPADM

## 2020-01-01 RX ORDER — NEOSTIGMINE METHYLSULFATE 1 MG/ML
INJECTION, SOLUTION INTRAVENOUS PRN
Status: DISCONTINUED | OUTPATIENT
Start: 2020-01-01 | End: 2020-01-01 | Stop reason: SDUPTHER

## 2020-01-01 RX ORDER — EPHEDRINE SULFATE/0.9% NACL/PF 50 MG/5 ML
SYRINGE (ML) INTRAVENOUS PRN
Status: DISCONTINUED | OUTPATIENT
Start: 2020-01-01 | End: 2020-01-01 | Stop reason: SDUPTHER

## 2020-01-01 RX ORDER — HEPARIN SODIUM (PORCINE) LOCK FLUSH IV SOLN 100 UNIT/ML 100 UNIT/ML
SOLUTION INTRAVENOUS PRN
Status: DISCONTINUED | OUTPATIENT
Start: 2020-01-01 | End: 2020-01-01 | Stop reason: ALTCHOICE

## 2020-01-01 RX ORDER — MIRTAZAPINE 15 MG/1
15 TABLET, FILM COATED ORAL NIGHTLY
Status: ON HOLD | COMMUNITY
End: 2020-01-01

## 2020-01-01 RX ADMIN — FERROUS SULFATE TAB 325 MG (65 MG ELEMENTAL FE) 325 MG: 325 (65 FE) TAB at 08:01

## 2020-01-01 RX ADMIN — Medication 3 MG: at 14:56

## 2020-01-01 RX ADMIN — MIRTAZAPINE 15 MG: 15 TABLET, FILM COATED ORAL at 20:36

## 2020-01-01 RX ADMIN — PROPOFOL 150 MG: 10 INJECTION, EMULSION INTRAVENOUS at 14:27

## 2020-01-01 RX ADMIN — Medication 10 ML: at 08:46

## 2020-01-01 RX ADMIN — SODIUM CHLORIDE: 9 INJECTION, SOLUTION INTRAVENOUS at 19:55

## 2020-01-01 RX ADMIN — FERROUS SULFATE TAB 325 MG (65 MG ELEMENTAL FE) 325 MG: 325 (65 FE) TAB at 08:48

## 2020-01-01 RX ADMIN — NIFEDIPINE 30 MG: 30 TABLET, FILM COATED, EXTENDED RELEASE ORAL at 08:46

## 2020-01-01 RX ADMIN — SODIUM CHLORIDE, POTASSIUM CHLORIDE, SODIUM LACTATE AND CALCIUM CHLORIDE: 600; 310; 30; 20 INJECTION, SOLUTION INTRAVENOUS at 20:52

## 2020-01-01 RX ADMIN — Medication 10 MG: at 11:33

## 2020-01-01 RX ADMIN — OXYCODONE HYDROCHLORIDE AND ACETAMINOPHEN 1 TABLET: 5; 325 TABLET ORAL at 20:27

## 2020-01-01 RX ADMIN — DEXTROSE AND SODIUM CHLORIDE: 5; 450 INJECTION, SOLUTION INTRAVENOUS at 22:30

## 2020-01-01 RX ADMIN — APIXABAN 5 MG: 5 TABLET, FILM COATED ORAL at 09:11

## 2020-01-01 RX ADMIN — FERROUS SULFATE TAB 325 MG (65 MG ELEMENTAL FE) 325 MG: 325 (65 FE) TAB at 09:09

## 2020-01-01 RX ADMIN — ATORVASTATIN CALCIUM 20 MG: 10 TABLET, FILM COATED ORAL at 08:51

## 2020-01-01 RX ADMIN — LOSARTAN POTASSIUM 25 MG: 25 TABLET, FILM COATED ORAL at 09:08

## 2020-01-01 RX ADMIN — ACETAMINOPHEN 650 MG: 325 TABLET, FILM COATED ORAL at 20:52

## 2020-01-01 RX ADMIN — SUCRALFATE 1 G: 1 TABLET ORAL at 17:07

## 2020-01-01 RX ADMIN — DEXTROSE AND SODIUM CHLORIDE: 5; 450 INJECTION, SOLUTION INTRAVENOUS at 23:03

## 2020-01-01 RX ADMIN — LIDOCAINE HYDROCHLORIDE 40 MG: 20 INJECTION, SOLUTION INTRAVENOUS at 11:10

## 2020-01-01 RX ADMIN — ACETAMINOPHEN 650 MG: 325 TABLET, FILM COATED ORAL at 23:57

## 2020-01-01 RX ADMIN — Medication 10 ML: at 08:53

## 2020-01-01 RX ADMIN — ALLOPURINOL 150 MG: 300 TABLET ORAL at 08:48

## 2020-01-01 RX ADMIN — CLOPIDOGREL 75 MG: 75 TABLET, FILM COATED ORAL at 09:08

## 2020-01-01 RX ADMIN — OXYCODONE HYDROCHLORIDE AND ACETAMINOPHEN 1 TABLET: 5; 325 TABLET ORAL at 03:04

## 2020-01-01 RX ADMIN — FERROUS SULFATE TAB 325 MG (65 MG ELEMENTAL FE) 325 MG: 325 (65 FE) TAB at 08:24

## 2020-01-01 RX ADMIN — IPRATROPIUM BROMIDE AND ALBUTEROL SULFATE 1 AMPULE: 2.5; .5 SOLUTION RESPIRATORY (INHALATION) at 20:01

## 2020-01-01 RX ADMIN — LOSARTAN POTASSIUM 25 MG: 25 TABLET, FILM COATED ORAL at 08:54

## 2020-01-01 RX ADMIN — SUCRALFATE 1 G: 1 TABLET ORAL at 16:17

## 2020-01-01 RX ADMIN — ACETAMINOPHEN 650 MG: 325 TABLET, FILM COATED ORAL at 08:45

## 2020-01-01 RX ADMIN — PIPERACILLIN AND TAZOBACTAM 3.38 G: 3; .375 INJECTION, POWDER, FOR SOLUTION INTRAVENOUS at 22:23

## 2020-01-01 RX ADMIN — MIRTAZAPINE 15 MG: 15 TABLET, FILM COATED ORAL at 22:26

## 2020-01-01 RX ADMIN — IOPAMIDOL 60 ML: 755 INJECTION, SOLUTION INTRAVENOUS at 14:51

## 2020-01-01 RX ADMIN — PIPERACILLIN AND TAZOBACTAM 3.38 G: 3; .375 INJECTION, POWDER, FOR SOLUTION INTRAVENOUS at 07:59

## 2020-01-01 RX ADMIN — Medication 10 ML: at 20:45

## 2020-01-01 RX ADMIN — ATORVASTATIN CALCIUM 20 MG: 20 TABLET, FILM COATED ORAL at 08:01

## 2020-01-01 RX ADMIN — SODIUM CHLORIDE 1000 ML: 9 INJECTION, SOLUTION INTRAVENOUS at 12:55

## 2020-01-01 RX ADMIN — SUCRALFATE 1 G: 1 TABLET ORAL at 09:10

## 2020-01-01 RX ADMIN — ONDANSETRON HYDROCHLORIDE 4 MG: 2 INJECTION, SOLUTION INTRAMUSCULAR; INTRAVENOUS at 14:45

## 2020-01-01 RX ADMIN — FERROUS SULFATE TAB 325 MG (65 MG ELEMENTAL FE) 325 MG: 325 (65 FE) TAB at 08:20

## 2020-01-01 RX ADMIN — VANCOMYCIN HYDROCHLORIDE 1000 MG: 1 INJECTION, POWDER, LYOPHILIZED, FOR SOLUTION INTRAVENOUS at 02:14

## 2020-01-01 RX ADMIN — APIXABAN 5 MG: 5 TABLET, FILM COATED ORAL at 09:44

## 2020-01-01 RX ADMIN — PIPERACILLIN AND TAZOBACTAM 3.38 G: 3; .375 INJECTION, POWDER, FOR SOLUTION INTRAVENOUS at 08:01

## 2020-01-01 RX ADMIN — Medication 10 ML: at 14:51

## 2020-01-01 RX ADMIN — SUCRALFATE 1 G: 1 TABLET ORAL at 08:45

## 2020-01-01 RX ADMIN — APIXABAN 5 MG: 5 TABLET, FILM COATED ORAL at 08:02

## 2020-01-01 RX ADMIN — ACETAMINOPHEN 650 MG: 325 TABLET, FILM COATED ORAL at 00:00

## 2020-01-01 RX ADMIN — IOHEXOL 50 ML: 240 INJECTION, SOLUTION INTRATHECAL; INTRAVASCULAR; INTRAVENOUS; ORAL at 07:32

## 2020-01-01 RX ADMIN — FERROUS SULFATE TAB 325 MG (65 MG ELEMENTAL FE) 325 MG: 325 (65 FE) TAB at 08:28

## 2020-01-01 RX ADMIN — Medication 10 ML: at 08:49

## 2020-01-01 RX ADMIN — FERROUS SULFATE TAB 325 MG (65 MG ELEMENTAL FE) 325 MG: 325 (65 FE) TAB at 08:54

## 2020-01-01 RX ADMIN — PROPOFOL 20 MCG/KG/MIN: 10 INJECTION, EMULSION INTRAVENOUS at 11:10

## 2020-01-01 RX ADMIN — DEXTROSE AND SODIUM CHLORIDE: 5; 450 INJECTION, SOLUTION INTRAVENOUS at 09:09

## 2020-01-01 RX ADMIN — CLOPIDOGREL 75 MG: 75 TABLET, FILM COATED ORAL at 08:54

## 2020-01-01 RX ADMIN — Medication 10 ML: at 20:37

## 2020-01-01 RX ADMIN — HYDROMORPHONE HYDROCHLORIDE 2 MG: 2 TABLET ORAL at 22:17

## 2020-01-01 RX ADMIN — APIXABAN 5 MG: 5 TABLET, FILM COATED ORAL at 22:26

## 2020-01-01 RX ADMIN — SUCRALFATE 1 G: 1 TABLET ORAL at 16:47

## 2020-01-01 RX ADMIN — ATORVASTATIN CALCIUM 20 MG: 10 TABLET, FILM COATED ORAL at 09:43

## 2020-01-01 RX ADMIN — ALLOPURINOL 150 MG: 300 TABLET ORAL at 11:14

## 2020-01-01 RX ADMIN — LIDOCAINE HYDROCHLORIDE 100 MG: 20 INJECTION, SOLUTION INTRAVENOUS at 14:27

## 2020-01-01 RX ADMIN — SODIUM CHLORIDE, PRESERVATIVE FREE 10 ML: 5 INJECTION INTRAVENOUS at 22:16

## 2020-01-01 RX ADMIN — ATORVASTATIN CALCIUM 20 MG: 10 TABLET, FILM COATED ORAL at 11:13

## 2020-01-01 RX ADMIN — IPRATROPIUM BROMIDE AND ALBUTEROL SULFATE 1 AMPULE: 2.5; .5 SOLUTION RESPIRATORY (INHALATION) at 08:49

## 2020-01-01 RX ADMIN — SUCRALFATE 1 G: 1 TABLET ORAL at 21:25

## 2020-01-01 RX ADMIN — SODIUM CHLORIDE, PRESERVATIVE FREE 10 ML: 5 INJECTION INTRAVENOUS at 08:29

## 2020-01-01 RX ADMIN — SODIUM CHLORIDE, PRESERVATIVE FREE 10 ML: 5 INJECTION INTRAVENOUS at 08:03

## 2020-01-01 RX ADMIN — FENTANYL CITRATE 50 MCG: 50 INJECTION, SOLUTION INTRAMUSCULAR; INTRAVENOUS at 14:27

## 2020-01-01 RX ADMIN — ACETAMINOPHEN 650 MG: 325 TABLET, FILM COATED ORAL at 11:35

## 2020-01-01 RX ADMIN — NIFEDIPINE 30 MG: 30 TABLET, FILM COATED, EXTENDED RELEASE ORAL at 08:48

## 2020-01-01 RX ADMIN — LOSARTAN POTASSIUM 25 MG: 25 TABLET, FILM COATED ORAL at 09:10

## 2020-01-01 RX ADMIN — SODIUM CHLORIDE 25 ML: 9 INJECTION, SOLUTION INTRAVENOUS at 20:52

## 2020-01-01 RX ADMIN — SODIUM CHLORIDE 25 ML: 9 INJECTION, SOLUTION INTRAVENOUS at 12:53

## 2020-01-01 RX ADMIN — SUCRALFATE 1 G: 1 TABLET ORAL at 08:54

## 2020-01-01 RX ADMIN — NIFEDIPINE 30 MG: 30 TABLET, FILM COATED, EXTENDED RELEASE ORAL at 09:08

## 2020-01-01 RX ADMIN — SODIUM CHLORIDE: 9 INJECTION, SOLUTION INTRAVENOUS at 22:39

## 2020-01-01 RX ADMIN — SUCRALFATE 1 G: 1 TABLET ORAL at 22:26

## 2020-01-01 RX ADMIN — APIXABAN 5 MG: 5 TABLET, FILM COATED ORAL at 09:09

## 2020-01-01 RX ADMIN — APIXABAN 5 MG: 5 TABLET, FILM COATED ORAL at 21:08

## 2020-01-01 RX ADMIN — SODIUM CHLORIDE: 9 INJECTION, SOLUTION INTRAVENOUS at 12:09

## 2020-01-01 RX ADMIN — ALLOPURINOL 150 MG: 300 TABLET ORAL at 08:46

## 2020-01-01 RX ADMIN — ATORVASTATIN CALCIUM 20 MG: 20 TABLET, FILM COATED ORAL at 08:29

## 2020-01-01 RX ADMIN — DEXAMETHASONE SODIUM PHOSPHATE 10 MG: 10 INJECTION INTRAMUSCULAR; INTRAVENOUS at 14:32

## 2020-01-01 RX ADMIN — CLOPIDOGREL 75 MG: 75 TABLET, FILM COATED ORAL at 08:01

## 2020-01-01 RX ADMIN — PHENYLEPHRINE HYDROCHLORIDE 100 MCG: 10 INJECTION INTRAVENOUS at 14:33

## 2020-01-01 RX ADMIN — MIRTAZAPINE 15 MG: 15 TABLET, FILM COATED ORAL at 20:45

## 2020-01-01 RX ADMIN — CLOPIDOGREL 75 MG: 75 TABLET, FILM COATED ORAL at 09:43

## 2020-01-01 RX ADMIN — PIPERACILLIN AND TAZOBACTAM 3.38 G: 3; .375 INJECTION, POWDER, FOR SOLUTION INTRAVENOUS at 08:28

## 2020-01-01 RX ADMIN — SUCRALFATE 1 G: 1 TABLET ORAL at 17:30

## 2020-01-01 RX ADMIN — MIRTAZAPINE 15 MG: 15 TABLET, FILM COATED ORAL at 20:27

## 2020-01-01 RX ADMIN — PIPERACILLIN AND TAZOBACTAM 3.38 G: 3; .375 INJECTION, POWDER, LYOPHILIZED, FOR SOLUTION INTRAVENOUS at 16:04

## 2020-01-01 RX ADMIN — APIXABAN 5 MG: 5 TABLET, FILM COATED ORAL at 19:11

## 2020-01-01 RX ADMIN — SUCRALFATE 1 G: 1 TABLET ORAL at 11:13

## 2020-01-01 RX ADMIN — SODIUM CHLORIDE, POTASSIUM CHLORIDE, SODIUM LACTATE AND CALCIUM CHLORIDE: 600; 310; 30; 20 INJECTION, SOLUTION INTRAVENOUS at 22:19

## 2020-01-01 RX ADMIN — VANCOMYCIN HYDROCHLORIDE 750 MG: 1 INJECTION, POWDER, LYOPHILIZED, FOR SOLUTION INTRAVENOUS at 20:58

## 2020-01-01 RX ADMIN — APIXABAN 5 MG: 5 TABLET, FILM COATED ORAL at 22:16

## 2020-01-01 RX ADMIN — PHENYLEPHRINE HYDROCHLORIDE 100 MCG: 10 INJECTION INTRAVENOUS at 14:39

## 2020-01-01 RX ADMIN — SODIUM CHLORIDE 1000 ML: 9 INJECTION, SOLUTION INTRAVENOUS at 15:57

## 2020-01-01 RX ADMIN — ACETAMINOPHEN 650 MG: 325 TABLET, FILM COATED ORAL at 14:26

## 2020-01-01 RX ADMIN — CLOPIDOGREL 75 MG: 75 TABLET, FILM COATED ORAL at 08:29

## 2020-01-01 RX ADMIN — APIXABAN 5 MG: 5 TABLET, FILM COATED ORAL at 21:06

## 2020-01-01 RX ADMIN — ATORVASTATIN CALCIUM 20 MG: 10 TABLET, FILM COATED ORAL at 09:10

## 2020-01-01 RX ADMIN — APIXABAN 5 MG: 5 TABLET, FILM COATED ORAL at 08:55

## 2020-01-01 RX ADMIN — CEFAZOLIN SODIUM 2 G: 2 SOLUTION INTRAVENOUS at 11:15

## 2020-01-01 RX ADMIN — SUCRALFATE 1 G: 1 TABLET ORAL at 22:17

## 2020-01-01 RX ADMIN — SUCRALFATE 1 G: 1 TABLET ORAL at 09:43

## 2020-01-01 RX ADMIN — APIXABAN 5 MG: 5 TABLET, FILM COATED ORAL at 20:36

## 2020-01-01 RX ADMIN — IPRATROPIUM BROMIDE AND ALBUTEROL SULFATE 1 AMPULE: 2.5; .5 SOLUTION RESPIRATORY (INHALATION) at 16:09

## 2020-01-01 RX ADMIN — CLOPIDOGREL 75 MG: 75 TABLET, FILM COATED ORAL at 08:47

## 2020-01-01 RX ADMIN — PROPOFOL 50 MCG/KG/MIN: 10 INJECTION, EMULSION INTRAVENOUS at 14:31

## 2020-01-01 RX ADMIN — HYDROMORPHONE HYDROCHLORIDE 2 MG: 2 TABLET ORAL at 04:19

## 2020-01-01 RX ADMIN — PIPERACILLIN AND TAZOBACTAM 3.38 G: 3; .375 INJECTION, POWDER, FOR SOLUTION INTRAVENOUS at 00:35

## 2020-01-01 RX ADMIN — APIXABAN 5 MG: 5 TABLET, FILM COATED ORAL at 20:45

## 2020-01-01 RX ADMIN — ATORVASTATIN CALCIUM 20 MG: 10 TABLET, FILM COATED ORAL at 08:54

## 2020-01-01 RX ADMIN — PIPERACILLIN AND TAZOBACTAM 3.38 G: 3; .375 INJECTION, POWDER, FOR SOLUTION INTRAVENOUS at 15:28

## 2020-01-01 RX ADMIN — NIFEDIPINE 30 MG: 30 TABLET, FILM COATED, EXTENDED RELEASE ORAL at 08:54

## 2020-01-01 RX ADMIN — PHENYLEPHRINE HYDROCHLORIDE 200 MCG: 10 INJECTION INTRAVENOUS at 14:46

## 2020-01-01 RX ADMIN — CLOPIDOGREL 75 MG: 75 TABLET, FILM COATED ORAL at 09:09

## 2020-01-01 RX ADMIN — APIXABAN 5 MG: 5 TABLET, FILM COATED ORAL at 08:28

## 2020-01-01 RX ADMIN — SUCRALFATE 1 G: 1 TABLET ORAL at 08:29

## 2020-01-01 RX ADMIN — MIRTAZAPINE 15 MG: 15 TABLET, FILM COATED ORAL at 21:25

## 2020-01-01 RX ADMIN — Medication 30 MG: at 14:27

## 2020-01-01 RX ADMIN — SUCRALFATE 1 G: 1 TABLET ORAL at 09:09

## 2020-01-01 RX ADMIN — MIRTAZAPINE 15 MG: 15 TABLET, FILM COATED ORAL at 21:08

## 2020-01-01 RX ADMIN — NIFEDIPINE 30 MG: 30 TABLET, FILM COATED, EXTENDED RELEASE ORAL at 11:13

## 2020-01-01 RX ADMIN — APIXABAN 5 MG: 5 TABLET, FILM COATED ORAL at 08:47

## 2020-01-01 RX ADMIN — IPRATROPIUM BROMIDE AND ALBUTEROL SULFATE 1 AMPULE: 2.5; .5 SOLUTION RESPIRATORY (INHALATION) at 08:36

## 2020-01-01 RX ADMIN — NIFEDIPINE 30 MG: 30 TABLET, FILM COATED, EXTENDED RELEASE ORAL at 09:09

## 2020-01-01 RX ADMIN — PIPERACILLIN AND TAZOBACTAM 3.38 G: 3; .375 INJECTION, POWDER, FOR SOLUTION INTRAVENOUS at 00:17

## 2020-01-01 RX ADMIN — SODIUM CHLORIDE, PRESERVATIVE FREE 10 ML: 5 INJECTION INTRAVENOUS at 08:01

## 2020-01-01 RX ADMIN — SUCRALFATE 1 G: 1 TABLET ORAL at 17:59

## 2020-01-01 RX ADMIN — PHENYLEPHRINE HYDROCHLORIDE 100 MCG: 10 INJECTION INTRAVENOUS at 14:31

## 2020-01-01 RX ADMIN — IOPAMIDOL 110 ML: 755 INJECTION, SOLUTION INTRAVENOUS at 08:53

## 2020-01-01 RX ADMIN — PIPERACILLIN AND TAZOBACTAM 3.38 G: 3; .375 INJECTION, POWDER, FOR SOLUTION INTRAVENOUS at 16:02

## 2020-01-01 RX ADMIN — SODIUM CHLORIDE: 9 INJECTION, SOLUTION INTRAVENOUS at 09:02

## 2020-01-01 RX ADMIN — SODIUM CHLORIDE: 9 INJECTION, SOLUTION INTRAVENOUS at 14:22

## 2020-01-01 RX ADMIN — IPRATROPIUM BROMIDE AND ALBUTEROL SULFATE 1 AMPULE: 2.5; .5 SOLUTION RESPIRATORY (INHALATION) at 12:15

## 2020-01-01 RX ADMIN — Medication 10 ML: at 11:15

## 2020-01-01 RX ADMIN — SODIUM CHLORIDE, POTASSIUM CHLORIDE, SODIUM LACTATE AND CALCIUM CHLORIDE: 600; 310; 30; 20 INJECTION, SOLUTION INTRAVENOUS at 00:17

## 2020-01-01 RX ADMIN — SODIUM CHLORIDE 25 ML: 9 INJECTION, SOLUTION INTRAVENOUS at 05:23

## 2020-01-01 RX ADMIN — FERROUS SULFATE TAB 325 MG (65 MG ELEMENTAL FE) 325 MG: 325 (65 FE) TAB at 08:45

## 2020-01-01 RX ADMIN — APIXABAN 5 MG: 5 TABLET, FILM COATED ORAL at 20:58

## 2020-01-01 RX ADMIN — MIRTAZAPINE 15 MG: 15 TABLET, FILM COATED ORAL at 21:06

## 2020-01-01 RX ADMIN — SUCRALFATE 1 G: 1 TABLET ORAL at 08:47

## 2020-01-01 RX ADMIN — ATORVASTATIN CALCIUM 20 MG: 10 TABLET, FILM COATED ORAL at 08:48

## 2020-01-01 RX ADMIN — ATORVASTATIN CALCIUM 20 MG: 10 TABLET, FILM COATED ORAL at 09:08

## 2020-01-01 RX ADMIN — APIXABAN 5 MG: 5 TABLET, FILM COATED ORAL at 20:52

## 2020-01-01 RX ADMIN — APIXABAN 5 MG: 5 TABLET, FILM COATED ORAL at 08:01

## 2020-01-01 RX ADMIN — SODIUM CHLORIDE 25 ML: 9 INJECTION, SOLUTION INTRAVENOUS at 12:24

## 2020-01-01 RX ADMIN — OXYCODONE HYDROCHLORIDE AND ACETAMINOPHEN 1 TABLET: 5; 325 TABLET ORAL at 21:06

## 2020-01-01 RX ADMIN — Medication 0.6 MG: at 14:56

## 2020-01-01 ASSESSMENT — PAIN DESCRIPTION - DESCRIPTORS
DESCRIPTORS: ACHING;CONSTANT
DESCRIPTORS: HEADACHE
DESCRIPTORS: ACHING;DISCOMFORT;HEADACHE
DESCRIPTORS: ACHING;CONSTANT;DISCOMFORT;SORE
DESCRIPTORS: ACHING;CONSTANT;DISCOMFORT;SORE
DESCRIPTORS: CONSTANT;DULL;ACHING

## 2020-01-01 ASSESSMENT — PULMONARY FUNCTION TESTS
PIF_VALUE: 28
PIF_VALUE: 18
PIF_VALUE: 2
PIF_VALUE: 18
PIF_VALUE: 17
PIF_VALUE: 0
PIF_VALUE: 1
PIF_VALUE: 21
PIF_VALUE: 0
PIF_VALUE: 18
PIF_VALUE: 2
PIF_VALUE: 0
PIF_VALUE: 26
PIF_VALUE: 30
PIF_VALUE: 5
PIF_VALUE: 1
PIF_VALUE: 30
PIF_VALUE: 31
PIF_VALUE: 3
PIF_VALUE: 20
PIF_VALUE: 0
PIF_VALUE: 0
PIF_VALUE: 1
PIF_VALUE: 17
PIF_VALUE: 18
PIF_VALUE: 16
PIF_VALUE: 16
PIF_VALUE: 17
PIF_VALUE: 26
PIF_VALUE: 17
PIF_VALUE: 2
PIF_VALUE: 13
PIF_VALUE: 32
PIF_VALUE: 26
PIF_VALUE: 32
PIF_VALUE: 16
PIF_VALUE: 24
PIF_VALUE: 0
PIF_VALUE: 31

## 2020-01-01 ASSESSMENT — ENCOUNTER SYMPTOMS
DYSPNEA ACTIVITY LEVEL: AFTER AMBULATING 1 FLIGHT OF STAIRS
ABDOMINAL DISTENTION: 0
RHINORRHEA: 0
DYSPNEA ACTIVITY LEVEL: AFTER AMBULATING 1 FLIGHT OF STAIRS
NAUSEA: 0
VOMITING: 0
DIARRHEA: 0
EYE REDNESS: 0
SHORTNESS OF BREATH: 1
EYE PAIN: 0
BLOOD IN STOOL: 0
SORE THROAT: 0
EYE DISCHARGE: 0
ABDOMINAL PAIN: 0
WHEEZING: 0
CONSTIPATION: 0
BACK PAIN: 0
COUGH: 0
SINUS PRESSURE: 0

## 2020-01-01 ASSESSMENT — PAIN - FUNCTIONAL ASSESSMENT
PAIN_FUNCTIONAL_ASSESSMENT: ACTIVITIES ARE NOT PREVENTED
PAIN_FUNCTIONAL_ASSESSMENT: ACTIVITIES ARE NOT PREVENTED
PAIN_FUNCTIONAL_ASSESSMENT: 0-10
PAIN_FUNCTIONAL_ASSESSMENT: 0-10
PAIN_FUNCTIONAL_ASSESSMENT: ACTIVITIES ARE NOT PREVENTED
PAIN_FUNCTIONAL_ASSESSMENT: ACTIVITIES ARE NOT PREVENTED

## 2020-01-01 ASSESSMENT — PAIN SCALES - GENERAL
PAINLEVEL_OUTOF10: 0
PAINLEVEL_OUTOF10: 3
PAINLEVEL_OUTOF10: 0
PAINLEVEL_OUTOF10: 0
PAINLEVEL_OUTOF10: 4
PAINLEVEL_OUTOF10: 7
PAINLEVEL_OUTOF10: 0
PAINLEVEL_OUTOF10: 5
PAINLEVEL_OUTOF10: 0
PAINLEVEL_OUTOF10: 3
PAINLEVEL_OUTOF10: 1
PAINLEVEL_OUTOF10: 3
PAINLEVEL_OUTOF10: 0
PAINLEVEL_OUTOF10: 3
PAINLEVEL_OUTOF10: 6
PAINLEVEL_OUTOF10: 0
PAINLEVEL_OUTOF10: 8
PAINLEVEL_OUTOF10: 0
PAINLEVEL_OUTOF10: 5
PAINLEVEL_OUTOF10: 1
PAINLEVEL_OUTOF10: 0
PAINLEVEL_OUTOF10: 5
PAINLEVEL_OUTOF10: 0
PAINLEVEL_OUTOF10: 5
PAINLEVEL_OUTOF10: 0

## 2020-01-01 ASSESSMENT — PAIN DESCRIPTION - PAIN TYPE
TYPE: ACUTE PAIN

## 2020-01-01 ASSESSMENT — PAIN DESCRIPTION - FREQUENCY
FREQUENCY: CONTINUOUS
FREQUENCY: INTERMITTENT

## 2020-01-01 ASSESSMENT — PAIN DESCRIPTION - LOCATION
LOCATION: HEAD

## 2020-01-01 ASSESSMENT — PAIN DESCRIPTION - PROGRESSION
CLINICAL_PROGRESSION: GRADUALLY WORSENING
CLINICAL_PROGRESSION: NOT CHANGED
CLINICAL_PROGRESSION: NOT CHANGED

## 2020-01-01 ASSESSMENT — PAIN DESCRIPTION - ONSET
ONSET: ON-GOING

## 2020-01-01 ASSESSMENT — PAIN DESCRIPTION - ORIENTATION
ORIENTATION: MID

## 2020-01-16 PROBLEM — D72.829 LEUKOCYTOSIS: Status: ACTIVE | Noted: 2020-01-01

## 2020-01-16 NOTE — H&P
Hospital Medicine History & Physical      PCP: Devon Talavera MD    Date of Admission: 1/16/2020    Date of Service: Pt seen/examined on 1/16/20 and Admitted to Inpatient with expected LOS greater than two midnights due to medical therapy. Chief Complaint:  weakness      History Of Present Illness:    .  79 y.o. male who presented to [address] [place] [address] with weakness. History obtained from the patient. He states he has lost over 20 pounds in the past 3 months due to decreased appetite. He states he is having weakness and was following with his PCP. He had labwork completed that indicated an increase in WBCs. He was to follow up with hem/onc to rule out leukemia per PCP report this morning. He states he has had a headache with chills also. Past Medical History:          Diagnosis Date    Anxiety     CAD (coronary artery disease)     Gout     Hyperlipidemia     Hypertension        Past Surgical History:          Procedure Laterality Date    COLONOSCOPY  7/26/2012    polyp,diverticulosis    COLONOSCOPY N/A 12/6/2019    COLONOSCOPY DIAGNOSTIC performed by Kymberly Cornell MD at [place] [address]      CORONARY ARTERY BYPASS GRAFT      ENDOSCOPY, COLON, DIAGNOSTIC      UPPER GASTROINTESTINAL ENDOSCOPY N/A 12/6/2019    EGD BIOPSY performed by Kymberly Cornell MD at [address] [place] [address]       Medications Prior to Admission:      Prior to Admission medications    Medication Sig Start Date End Date Taking?  Authorizing Provider   ferrous sulfate 325 (65 Fe) MG EC tablet Take 325 mg by mouth daily (with breakfast)   Yes Historical Provider, MD   mirtazapine (REMERON) 15 MG tablet Take 15 mg by mouth nightly   Yes Historical Provider, MD   sucralfate (CARAFATE) 1 GM tablet Take 1 tablet by mouth 2 times daily 12/6/19  Yes Kymberly Cornell MD   apixaban (ELIQUIS) 5 MG TABS tablet Take 1 tablet by mouth every 12 hours 10/3/19  Yes Stanley Melo MD olmesartan (BENICAR) 40 MG tablet Take 1 tablet by mouth daily 10/3/19  Yes Stanley Melo MD   NIFEdipine (ADALAT CC) 30 MG extended release tablet Take 1 tablet by mouth daily 10/4/19  Yes Stanley Melo MD   allopurinol (ZYLOPRIM) 300 MG tablet Take 150 mg by mouth daily   Yes Historical Provider, MD   atorvastatin (LIPITOR) 20 MG tablet Take 20 mg by mouth daily   Yes Historical Provider, MD   clopidogrel (PLAVIX) 75 MG tablet Take 75 mg by mouth daily    Yes Historical Provider, MD       Allergies:  Patient has no known allergies. Social History:      The patient currently lives at home. TOBACCO:   reports that he quit smoking about 30 years ago. His smoking use included cigarettes. He has never used smokeless tobacco.  ETOH:   reports no history of alcohol use. Family History:     Positive as follows:        Problem Relation Age of Onset    Heart Disease Mother     Heart Disease Father     Cancer Sister     Cancer Brother        REVIEW OF SYSTEMS:   Pertinent positives as noted in the HPI. All other systems reviewed and negative. PHYSICAL EXAM:    BP (!) 94/54   Pulse 68   Temp 97.7 °F (36.5 °C) (Temporal)   Resp 16   SpO2 99%     General appearance:  No apparent distress, appears stated age and cooperative. HEENT:  Normal cephalic, atraumatic without obvious deformity. Pupils equal, round, and reactive to light. Extra ocular muscles intact. Conjunctivae/corneas clear. Neck: Supple, with full range of motion. No jugular venous distention. Trachea midline. Respiratory:  Normal respiratory effort. Clear to auscultation, bilaterally without Rales/Wheezes/Rhonchi. Cardiovascular:  Regular rate and rhythm with normal S1/S2 without murmurs, rubs or gallops. Abdomen: Soft, non-tender, non-distended with normal bowel sounds. Musculoskeletal:  No clubbing, cyanosis or edema bilaterally. Full range of motion without deformity.   Skin: Skin color, texture, turgor normal.  No rashes

## 2020-01-16 NOTE — PROGRESS NOTES
Attempted to reach Dr. Yesenia Griffith through perfect serve, error voicemail received. Will attempt at a later time.

## 2020-01-17 NOTE — PROGRESS NOTES
Evaluating Therapist: Radha More PT, DPT    Room #: 4219/3878-M  Diagnosis: Leukocytosis   Past medical history: HTN, CAD, Anxiety, HLD, Afib  The admitting diagnosis and active problem list as listed above have been reviewed prior to the initiation of this evaluation. Equipment owned: None  PRECAUTIONS: Falls    Social:  Pt lives with wife in a 1 floor plan with 3 step(s) and 2 rail(s) to enter with 10 stairs and 1 HR to basement. Prior to admission pt walked with IND with no AD. Initial Evaluation  Date: 1/17/2020 Treatment  Date:     Short Term/ Long Term   Goals   AM-PAC 6 Clicks 49/55     Does pt have pain? N     Bed Mobility  Rolling: Supervision  Supine to sit: Supervision  Sit to supine: Supervision  Scooting: SUpervision  IND   Transfers Sit to stand: Supervision  Stand to sit: Supervision  Stand pivot: SBA  IND   Ambulation   45 feet x 2 with SBA with no AD  250 feet with IND with no AD   Stair negotiation: ascended and descended 10 steps with 1 rail with Krupa  10 steps with 1 rail with Mod I   BLE ROM WFL     BLE strength Grossly 4/5  Increase by 1/3 MMT grade   Balance Sitting: Good  Standing: Fair  Sitting: Normal  Standing: Fair+     Pt is alert and oriented x 4  Sensation: WNL  Edema: None    ASSESSMENT  Patient exhibits decreased strength, balance, coordination impairing functional mobility. Educated pt on techniques to improve safety and independence with bed mobility, transfers, balance, functional transfers, and functional mobility. Pt sat EOB for 10 minutes with Supervision in order to improve static and dynamic sitting balance and activity tolerance. Pt ambulated with decreased shira and stride length 45' x 2 with SBA with no AD. Pt experienced 2-3 LOB requiring Krupa throughout ambulation and exhibited an occasional L lateral lean. Pt demonstrated fair understanding of education/techniques requiring additional education/training as well as fair- safety awareness.  At end of

## 2020-01-17 NOTE — PLAN OF CARE
Problem: Falls - Risk of:  Goal: Will remain free from falls  Description  Will remain free from falls  1/17/2020 1128 by Paulie Santa RN  Outcome: Met This Shift     Problem: Pain:  Goal: Pain level will decrease  Description  Pain level will decrease  Outcome: Met This Shift

## 2020-01-17 NOTE — PROGRESS NOTES
OCCUPATIONAL THERAPY INITIAL EVALUATION      Date:2020  Patient Name: Lacey Barton  MRN: [id number]  : 1940  Room: [address] [place] [address]      Evaluating OT: Josie Chu OTR/L #[id number]    AM-PAC Daily Activity Raw Score:     Recommended Adaptive Equipment: shower chair, grab bars, possible AD for mobility To be further assessed      Diagnosis: leukocytosis       Pertinent Medical History:   Past Medical History:   Diagnosis Date    Anxiety     CAD (coronary artery disease)     Gout     Hyperlipidemia     Hypertension       Precautions:  Falls, bed alarm     Home Living: Pt lives with wife in a 2 story with 3 step(s) to enter and 2 rail(s); bed/bath on ,    Bathroom setup: walk in shower   Equipment owned: none  Prior Level of Function: Independent  with ADLs , Independent  with IADLs; using no AD for ambulation. Driving: yes    Pain Level: 0/10  Cognition: A&O: 3/4; Follows 2 step directions   Memory:  fair    Sequencing:  fair    Problem solving:  fair    Judgement/safety:  fair      Functional Assessment:   Initial Eval Status  Date: 20 Treatment Status  Date: Short Term Goals  Treatment frequency: PRN    Feeding Independent       Grooming Stand by Assist   Independent    UB Dressing Stand by Assist   Independent    LB Dressing Stand by Assist   Independent    Bathing Minimal/mod  Assist  Modified [place]    Toileting Stand by Assist   Modified [place]    Bed Mobility  Supine to sit: Supervision   Sit to supine: NT   Supine to sit: Independent   Sit to supine: Independent    Functional Transfers Sit to stand: Stand by Assist   Stand to sit: Stand by Assist   Stand pivot: Stand by Assist   Independent    Functional Mobility SBA with no AD  Functional household distance but pt's SpO2 dropped to 85% on room air. Approx. 2 minute recovery  indep with good activity tolerance   Balance Sitting:     Static:  wfl    Dynamic:SBA  Standing: SBA/CGA     Activity Tolerance Poor+  Monitor SpO2     Visual/  Perceptual Glasses: yes                Hand dominance: L  UE ROM: RUE:  WFL  LUE:  WFL  Strength: RUE: grossly 4/5 LUE: grossly 4/5   Strength: B WFL  Fine Motor Coordination:  B WFL    Hearing: WFL  Sensation:  No c/o numbness or tingling  Tone:  WFL  Edema: None noted                            Comments/Treatment: Upon arrival, patient in bed. Wife present in room. . Therapist educated and facilitated patient on techniques to increase safety and independence with bed mobility, functional transfers, and  education on adapted techniques to improve independence  and conserve energy during ADLs. Skilled monitoring of HR, O2 sats and pts response to treatment. SpO2 dropped to 85% with activity on room air. At end of session, patient sitting EOB with call light and phone within reach, all lines and tubes intact. Pt demonstrating fair understanding of education/techniques. Patient would benefit from continued OT  to improve activity tolerance, functional independence and quality of life.     Eval Complexity: Low    Assessment of current deficits   Functional mobility [x]  ADLs [x] Strength [x]  Cognition []  Functional transfers  [x] IADLs [] Safety Awareness [x]  Endurance [x]  Fine Motor Coordination [] Balance [x] Vision/perception [] Sensation []   Gross Motor Coordination [] ROM [] Delirium []                  Motor Control []    Plan of Care:   ADL retraining [x]   Equipment needs [x]   Neuromuscular re-education [x] Energy Conservation Techniques [x]  Functional Transfer training [x] Patient and/or Family Education [x]  Functional Mobility training [x]  Environmental Modifications [x]  Cognitive re-training []   Compensatory techniques for ADLs [x]  Splinting Needs []   Positioning to improve overall function [x]   Therapeutic Activity [x]  Therapeutic Exercise  [x]  Visual/Perceptual: []    Delirium prevention/treatment  []   Other:  []    Rehab Potential: Good for established

## 2020-01-17 NOTE — PROGRESS NOTES
Hospitalist Progress Note      PCP: Panda Reina MD    Date of Admission: 1/16/2020  Days in the hospital: 1     Subjective  Patient seen and examined at bedside. Patient denies any fevers, chills, chest pain, shortness of breath, nausea, vomiting. Still has elevated WBC, seen by hematology, work-up pending. Medications:  Reviewed    Infusion Medications   Scheduled Medications    clopidogrel  75 mg Oral Daily    allopurinol  150 mg Oral Daily    atorvastatin  20 mg Oral Daily    apixaban  5 mg Oral Q12H    NIFEdipine  30 mg Oral Daily    sucralfate  1 g Oral BID    ferrous sulfate  325 mg Oral Daily with breakfast    mirtazapine  15 mg Oral Nightly    sodium chloride flush  10 mL Intravenous 2 times per day     PRN Meds: acetaminophen, sodium chloride flush, magnesium hydroxide, ondansetron      Intake/Output Summary (Last 24 hours) at 1/17/2020 1639  Last data filed at 1/17/2020 1351  Gross per 24 hour   Intake 420 ml   Output --   Net 420 ml       Exam:    BP (!) 97/58   Pulse 72   Temp 98.1 °F (36.7 °C) (Oral)   Resp 16   SpO2 98%     HEENT: No pallor, no icterus. Neck: Supple. No lesions, scars or masses. Trachea midline. Respiratory:  CTA, good air entry. Cardiovascular: RRR, no murmur. Abdomen: Soft, non-tender, BS noted. Musculoskeletal: No joint pains or joint swelling noted. Neurologic: awake, alert and following commands       Labs:   Recent Labs     01/16/20  1823 01/17/20  0715 01/17/20  1415   WBC 18.2* 13.2*  --    HGB 9.5* 8.9*  --    HCT 32.8* 30.7* 30.5*    308  --      Recent Labs     01/17/20  0715      K 4.8      CO2 25   BUN 33*   CREATININE 1.2   CALCIUM 10.6*     No results for input(s): AST, ALT, BILIDIR, BILITOT, ALKPHOS in the last 72 hours. No results for input(s): INR in the last 72 hours. No results for input(s): Erven Sell in the last 72 hours.     Imaging:  CT ABDOMEN PELVIS W IV CONTRAST Additional Contrast? Oral (Results Pending)         Assessment/Plan:  Active Hospital Problems    Diagnosis Date Noted    Leukocytosis [D72.829] 01/16/2020    Atrial fibrillation and flutter (HCC) [I48.91, I48.92] 10/03/2019    Coronary artery disease [I25.10] 02/01/2013    Hypertension [I10] 02/01/2013    Hyperlipidemia [E78.5] 02/01/2013   Weight loss    Plan:  Unclear etiology, work-up pending, will rule out underlying malignancy, hematology following, no clear signs of infection, will continue to follow closely, continue with rest of his medication  Consult physical therapy      · Diet  · DIET GENERAL;  · Dietary Nutrition Supplements: Standard High Calorie Oral Supplement    · Code Status  · Full Code       Electronically signed by Milagro Green MD on 1/17/2020 at 4:39 PM  [employer]   Please contact me through perfect serve    NOTE: This report was transcribed using voice recognition software. Every effort was made to ensure accuracy; however, inadvertent computerized transcription errors may be present.

## 2020-01-18 PROBLEM — E43 SEVERE PROTEIN-CALORIE MALNUTRITION (HCC): Chronic | Status: ACTIVE | Noted: 2020-01-01

## 2020-01-18 NOTE — PROGRESS NOTES
Hospitalist Progress Note      PCP: Viraj Wu MD    Date of Admission: 1/16/2020  Days in the hospital: 2     Subjective  Patient seen and examined at bedside. Patient denies any fevers, chills, chest pain, shortness of breath, nausea, vomiting. Still has elevated WBC, seen by hematology, work-up pending. 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 CT surgery and pulmonary consulted. ALso with possible L adrenal mass      Medications:  Reviewed    Infusion Medications   Scheduled Medications    clopidogrel  75 mg Oral Daily    allopurinol  150 mg Oral Daily    atorvastatin  20 mg Oral Daily    apixaban  5 mg Oral Q12H    NIFEdipine  30 mg Oral Daily    sucralfate  1 g Oral BID    ferrous sulfate  325 mg Oral Daily with breakfast    mirtazapine  15 mg Oral Nightly    sodium chloride flush  10 mL Intravenous 2 times per day     PRN Meds: sodium chloride flush, acetaminophen, sodium chloride flush, magnesium hydroxide, ondansetron      Intake/Output Summary (Last 24 hours) at 1/18/2020 1314  Last data filed at 1/18/2020 0742  Gross per 24 hour   Intake 320 ml   Output 900 ml   Net -580 ml       Exam:    /66   Pulse 70   Temp 98 °F (36.7 °C) (Temporal)   Resp 18   Ht 5' 7\" (1.702 m)   Wt 139 lb 8.8 oz (63.3 kg)   SpO2 95%   BMI 21.86 kg/m²     HEENT: No pallor, no icterus. Neck: Supple. No lesions, scars or masses. Trachea midline. Respiratory:  CTA, good air entry. Cardiovascular: RRR, no murmur. Abdomen: Soft, non-tender, BS noted. Musculoskeletal: No joint pains or joint swelling noted.    Neurologic: awake, alert and following commands       Labs:   Recent Labs     01/16/20  1823 01/17/20  0715 01/17/20  1415 01/18/20  0657   WBC 18.2* 13.2*  --   --    HGB 9.5* 8.9*  --  9.5*   HCT 32.8* 30.7* 30.5* 32.5*    308  --   --      Recent Labs     01/17/20  0715      K 4.8      CO2 25   BUN 33*   CREATININE 1.2   CALCIUM

## 2020-01-18 NOTE — CONSULTS
CARDIOLOGY CONSULTATION    Patient Name:  Cami Zuñiga    :  1940    Reason for Consultation:   Preoperative cardiovascular risk assessment    History of Present Illness:   Cami Zuñiga presents to [facility] following a 3-month history of rapid weight loss associated with feeling of fullness trying to eat meals as well as recurrent cephalgia. He notes that he is lost nearly 22 pounds over the past 3 months. He denies any hemoptysis or change in his bowel habits. He denies any chest discomfort presently. Does have a history of paroxysmal atrial fibrillation as well as significant coronary artery disease for which he underwent previous coronary artery bypass surgery and subsequent catheter-based coronary intervention. He is not overactive presently he denies any exertional chest pressure. 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 He is now scheduled to undergo a bronchoscopy with appropriate biopsy of tissue to determine etiology of his pulmonary mass. Past Medical History:   has a past medical history of Anxiety, CAD (coronary artery disease), Gout, Hyperlipidemia, and Hypertension. Surgical History:   has a past surgical history that includes Coronary artery bypass graft; Coronary angioplasty with stent; Colonoscopy (2012); Endoscopy, colon, diagnostic; Upper gastrointestinal endoscopy (N/A, 2019); and Colonoscopy (N/A, 2019). Social History:   reports that he quit smoking about 30 years ago. His smoking use included cigarettes. He has never used smokeless tobacco. He reports that he does not drink alcohol or use drugs. Family History:  family history includes Cancer in his brother and sister; Heart Disease in his father and mother. Medications:  Prior to Admission medications    Medication Sig Start Date End Date Taking?  Authorizing Provider   ferrous sulfate 325 (65 Fe) MG EC tablet Take 325 mg by mouth daily (with breakfast)   Yes Historical Provider, MD   mirtazapine (REMERON) 15 MG tablet Take 15 mg by mouth nightly   Yes Historical Provider, MD   sucralfate (CARAFATE) 1 GM tablet Take 1 tablet by mouth 2 times daily 12/6/19  Yes Juan Arana MD   apixaban (ELIQUIS) 5 MG TABS tablet Take 1 tablet by mouth every 12 hours 10/3/19  Yes Verona Gamboa MD   olmesartan [facility]) 40 MG tablet Take 1 tablet by mouth daily 10/3/19  Yes Verona Gamboa MD   NIFEdipine (ADALAT CC) 30 MG extended release tablet Take 1 tablet by mouth daily 10/4/19  Yes Verona Gamboa MD   allopurinol (ZYLOPRIM) 300 MG tablet Take 150 mg by mouth daily   Yes Historical Provider, MD   atorvastatin (LIPITOR) 20 MG tablet Take 20 mg by mouth daily   Yes Historical Provider, MD   clopidogrel (PLAVIX) 75 MG tablet Take 75 mg by mouth daily    Yes Historical Provider, MD       Allergies:  Patient has no known allergies. Review of Systems:   · Constitutional: there has been a significant unanticipated weight loss. There's been significant change in energy level, sleep pattern or activity level. No fever chills or rigors. · Eyes: No visual changes or diplopia. No scleral icterus. · ENT: No Headaches, hearing loss or vertigo. No mouth sores or sore throat. No change in taste or smell. · Cardiovascular: No chest discomfort, + dyspnea on exertion, + palpitations, loss of consciousness, no phlebitis, no claudication. · Respiratory: No cough or wheezing, no sputum production. No hemoptysis, pleuritic pain. · Gastrointestinal: No abdominal pain, appetite loss, blood in stools. No change in bowel habits. No hematemesis  · Genitourinary: No dysuria, trouble voiding or hematuria. No nocturia or increased frequency. · Musculoskeletal:  No gait disturbance, weakness or joint complaints. · Integumentary: No rash or pruritis.   · Neurological: + Recurrent headache, diplopia, change in muscle strength, numbness or tingling. No change in gait, balance, coordination, mood, affect, memory, mentation, behavior. · Psychiatric: No anxiety or depression. · Endocrine: No temperature intolerance. No excessive thirst, fluid intake, or urination. No tremor. · Hematologic/Lymphatic: No abnormal bruising or bleeding, blood clots or swollen lymph nodes. · Allergic/Immunologic: No nasal congestion or hives. Physical Examination:    Vital Signs: /66   Pulse 70   Temp 98 °F (36.7 °C) (Temporal)   Resp 18   Ht 5' 7\" (1.702 m)   Wt 139 lb 8.8 oz (63.3 kg)   SpO2 95%   BMI 21.86 kg/m²   General appearance: Somewhat cachectic, ectomorphic body habitus, alert, no distress. Skin: Skin color, texture, turgor normal. No rashes or lesions. No induration or tightening palpated. Head: Normocephalic. No masses, lesions, tenderness or abnormalities  Eyes: Conjunctivae/corneas clear. PERRL, EOMs intact. Sclera non icteric. Ears: External ears normal. Canals clear. TM's clear bilaterally. Hearing normal to finger rub. Nose/Sinuses: Nares normal. Septum midline. Mucosa normal. No drainage or sinus tenderness. Oropharynx: Lips, mucosa, and tongue normal. Oropharynx clear with no exudate seen. Neck: Neck supple and symmetric. No adenopathy. Thyroid symmetric, normal size, without nodules. Trachea is midline. Carotids brisk in upstroke without bruits, no abnormal JVP noted at 45°. Chest: Even excursion  Lungs: Lungs clear to auscultation bilaterally. No retractions or use of accessory muscles. No tactile vocal fremitus. No rhonchi, crackles or rales. Heart:  S1 > S2. Irregular, regular rhythm. No gallop or murmur. No rub, palpable thrill or heave noted. PMI 5th intercostal space midclavicular line. Abdomen: Abdomen soft, mildly protuberant, non-tender. BS normal. No masses, organomegaly. No hernia noted. Extremities: Extremities normal. No deformities, edema, or skin discoloration. No cyanosis or clubbing noted to the nails. normal architecture. There is previous cholecystectomy. Spleen, pancreas, and the right adrenal gland appear normal. 1.1 cm left adrenal nodule is present. Small cystic lesions are identified in the kidneys. There is calcification of aorta and degenerative changes in lumbar spine with bilateral pars defect at L5 and a grade 1 spondylolisthesis at L5-S1. Small nonspecific retroperitoneal lymph nodes measuring up to 7 mm are identified. Pelvis. Bladder is unremarkable. Prostate gland is prominent measuring 5.1 x 3.6 cm with  mass effect on the bladder. There is diverticulosis of colon with a mild thickening of the sigmoid colon. The appendix is normal.     A large necrotic mass in the right infrahilar region invading the right heart border concerning for malignancy. Further assessment is recommended. There may be developing left adrenal metastasis. Diverticulosis of colon with a mild thickening of the sigmoid colon which may be due to spasm or mild uncomplicated diverticulitis. ALERT:  THIS IS AN ABNORMAL REPORT     Assessment:    Principal Problem:    Leukocytosis  Active Problems:    Coronary artery disease    Hypertension    Hyperlipidemia    Atrial fibrillation and flutter (HCC)  Resolved Problems:    * No resolved hospital problems. *      Plan:  Based upon Mr. Bell's clinical presentation it would appear that he most likely has an underlying neoplastic process which is pinching upon the right atrium. I have indicated that there are cardiovascular risks associated with discontinuing his anticoagulation as well as the procedure but that the indications far outweigh these risks. Both he and his wife indicate they fully understand and wish to proceed. Will likely obtain a two-dimensional echocardiogram as well for as an outpatient.   I have spent more than 45 minutes face to face with Cassidy Young reviewing notes and laboratory data with greater than 50% of this time instructing and counseling the patient and his wife who is at his bedside. Regarding my findings and recommendations and I have answered all questions as posed to me by Mr. Sirisha Cage. Thank you, Paty Garcia MD for allowing me to consult in the care of this patient. Falguni Malcolm DO, FACP, FACC, [facility]    NOTE:  This report was transcribed using voice recognition software. Every effort was made to ensure accuracy; however, inadvertent computerized transcription errors may be present.

## 2020-01-18 NOTE — PROGRESS NOTES
Brittany Bell        SUBJECTIVE:  Admitted with wt. Loss , leukocytosis,anemia    No new complaints. No fever , no hemoptysis    OBJECTIVE:    /66   Pulse 70   Temp 98 °F (36.7 °C) (Temporal)   Resp 18   Ht 5' 7\" (1.702 m)   Wt 139 lb 8.8 oz (63.3 kg)   SpO2 95%   BMI 21.86 kg/m²   PHYSICAL:  GENERAL:  Alert, orient x 3, in no acute distress. HEENT:  No icterus  LYMPH:  No lymphadenopathy. HEART:  Regular rate and rhythm. No murmurs. LUNGS:  Clear to auscultation. ABDOMEN:  Soft. Non-tender. No mass / ascites  EXTREMITIES:  Warm,dry. No edema. NEURO:  No focal deficits.            CBC with Differential:    Lab Results   Component Value Date    WBC 13.2 01/17/2020    RBC 3.70 01/17/2020    HGB 9.5 01/18/2020    HCT 32.5 01/18/2020     01/17/2020    MCV 83.0 01/17/2020    MCH 24.1 01/17/2020    MCHC 29.0 01/17/2020    RDW 16.2 01/17/2020    SEGSPCT 84 07/20/2012    LYMPHOPCT 1.8 01/16/2020    MONOPCT 2.6 01/16/2020    BASOPCT 0.2 01/16/2020    MONOSABS 0.55 01/16/2020    LYMPHSABS 0.36 01/16/2020    EOSABS 0.16 01/16/2020    BASOSABS 0.00 01/16/2020        CMP:    Lab Results   Component Value Date     01/17/2020    K 4.8 01/17/2020     01/17/2020    CO2 25 01/17/2020    BUN 33 01/17/2020    CREATININE 1.2 01/17/2020    GFRAA >60 01/17/2020    LABGLOM 58 01/17/2020    GLUCOSE 97 01/17/2020    PROT 7.2 12/18/2019    LABALBU 3.9 12/18/2019    LABALBU 5.0 01/27/2012    CALCIUM 10.6 01/17/2020    BILITOT 0.5 12/18/2019    ALKPHOS 126 12/18/2019    AST 28 12/18/2019    ALT 29 12/18/2019     LDH:    Lab Results   Component Value Date     01/17/2020     PT/INR:  No results found for: PROTIME, INR  PTT:  No results found for: APTT[APTT  VITAMIN B12: No components found for: B12  FOLATE:    Lab Results   Component Value Date    FOLATE 7.6 01/17/2020     IRON:    Lab Results   Component Value Date    IRON 26 12/06/2019     Iron Saturation:  No components found for:

## 2020-01-18 NOTE — PROGRESS NOTES
Nutrition Assessment    Type and Reason for Visit: Initial, Positive Nutrition Screen(Wt Loss, Poor Intake)    Nutrition Recommendations: Continue Diet. Modify Current ONS to Ensure High Natan ONS Once Daily and Start Magic Cup Frozen ONS BID per pt request 2/2 pt states dislike for Ensure. Nutrition Assessment: Pt Severely Malnourished upon admit AEB moderate muscle/fat wasting, poor intake and ~13% wt loss x ~3.5 months d/t poor appetite per pt and pt's wife. Pt at further risk d/t increased needs 2/2 suspected Lung CA (bx pend). Malnutrition Assessment:  · Malnutrition Status: Meets the criteria for severe malnutrition  · Context: Chronic illness  · Findings of the 6 clinical characteristics of malnutrition (Minimum of 2 out of 6 clinical characteristics is required to make the diagnosis of moderate or severe Protein Calorie Malnutrition based on AND/ASPEN Guidelines):  1. Energy Intake-Less than or equal to 75% of estimated energy requirement, Greater than or equal to 3 months    2. Weight Loss-10% loss or greater, in 3 months  3. Fat Loss-Moderate subcutaneous fat loss, Orbital, Triceps  4. Muscle Loss-Moderate muscle mass loss, Temples (temporalis muscle), Clavicles (pectoralis and deltoids), Scapula (trapezius), Calf (gastrocnemius), Interosseous  5. Fluid Accumulation-No significant fluid accumulation    6.  Strength-Not measured    Nutrition Risk Level:  Moderate    Nutrient Needs:  · Estimated Daily Total Kcal: 5724-5004(MSJ x 1.2 SF per CBW)  · Estimated Daily Protein (g): 85-95(1.3-1.5 gm/kg per CBW 2/2 severe malnutr.)  · Estimated Daily Total Fluid (ml/day): 9478-0707(1 ml/kcal)    Nutrition Diagnosis:   · Problem: Severe malnutrition, In context of chronic illness  · Etiology: related to Insufficient energy/nutrient consumption(2/2 poor appetite)     Signs and symptoms:  as evidenced by Diet history of poor intake, Weight loss, Weight loss greater than or equal to 7.5% in 3 months,

## 2020-01-19 NOTE — PLAN OF CARE
Problem: Falls - Risk of:  Goal: Will remain free from falls  Description  Will remain free from falls  1/19/2020 3858 by Kourtney Medrano, RN  Outcome: Met This Shift  1/18/2020 2212 by Kourtney Medrano, RN  Outcome: Met This Shift

## 2020-01-19 NOTE — PROGRESS NOTES
Aida Seng Gomezzbigniew        SUBJECTIVE:  Admitted with wt. Loss , leukocytosis,anemia    No new complaints. No fever , no hemoptysis  Wife at bedside    OBJECTIVE:    BP (!) 111/58   Pulse 72   Temp 99 °F (37.2 °C) (Temporal)   Resp 18   Ht 5' 7\" (1.702 m)   Wt 138 lb 14.2 oz (63 kg)   SpO2 97%   BMI 21.75 kg/m²   PHYSICAL:  GENERAL:  Alert, orient x 3, in no acute distress. HEENT:  No icterus  LYMPH:  No lymphadenopathy. HEART:  Regular rate and rhythm. No murmurs. LUNGS:  Clear to auscultation. ABDOMEN:  Soft. Non-tender. No mass / ascites  EXTREMITIES:  Warm,dry. No edema. NEURO:  No focal deficits.            CBC with Differential:    Lab Results   Component Value Date    WBC 13.2 01/17/2020    RBC 3.70 01/17/2020    HGB 9.5 01/18/2020    HCT 32.5 01/18/2020     01/17/2020    MCV 83.0 01/17/2020    MCH 24.1 01/17/2020    MCHC 29.0 01/17/2020    RDW 16.2 01/17/2020    SEGSPCT 84 07/20/2012    LYMPHOPCT 1.8 01/16/2020    MONOPCT 2.6 01/16/2020    BASOPCT 0.2 01/16/2020    MONOSABS 0.55 01/16/2020    LYMPHSABS 0.36 01/16/2020    EOSABS 0.16 01/16/2020    BASOSABS 0.00 01/16/2020        CMP:    Lab Results   Component Value Date     01/17/2020    K 4.8 01/17/2020     01/17/2020    CO2 25 01/17/2020    BUN 33 01/17/2020    CREATININE 1.2 01/17/2020    GFRAA >60 01/17/2020    LABGLOM 58 01/17/2020    GLUCOSE 97 01/17/2020    PROT 7.2 12/18/2019    LABALBU 3.9 12/18/2019    LABALBU 5.0 01/27/2012    CALCIUM 10.6 01/17/2020    BILITOT 0.5 12/18/2019    ALKPHOS 126 12/18/2019    AST 28 12/18/2019    ALT 29 12/18/2019     LDH:    Lab Results   Component Value Date     01/17/2020     PT/INR:  No results found for: PROTIME, INR  PTT:  No results found for: APTT[APTT  VITAMIN B12: No components found for: B12  FOLATE:    Lab Results   Component Value Date    FOLATE 7.6 01/17/2020     IRON:    Lab Results   Component Value Date    IRON 26 12/06/2019     Iron Saturation:  No components

## 2020-01-19 NOTE — PLAN OF CARE
Problem: Falls - Risk of:  Goal: Will remain free from falls  Description  Will remain free from falls  1/18/2020 2212 by Quincy Ruiz RN  Outcome: Met This Shift  1/18/2020 1008 by Minna Snellen, RN  Outcome: Met This Shift

## 2020-01-19 NOTE — CONSULTS
Pulmonary [facility]                             Pulmonary Consult/Progress Note :          Patient: Maryanne Gann  MRN: [id number]  : 1940      Date of Admission: .2020  3:20 PM    Consulting Physician:Dr Paty Goldman         Reason for Consultation:Lung mass   CC : SOB ,weight loss   HPI:   Mr Betty Rausch is 79year old male with 40 PPY smoking history who presented with general weakness and anemia and following a 3-month history of rapid weight loss associated with feeling of fullness trying to eat meals as well as recurrent cephalgia. He notes that he is lost nearly 22 pounds over the past 3 months. There is no vomiting ,He also denies any hemoptysis or change in his bowel habits. He denies any chest discomfort presently. He Does have a history of paroxysmal atrial fibrillation as well as significant coronary artery disease for which he underwent previous CABGS and subsequent catheter-based coronary intervention.       a CT scan suggests a lung tumor encroaching upon the right atrium.           PAST MEDICAL HISTORY:   Past Medical History:   Diagnosis Date    Anxiety     CAD (coronary artery disease)     Gout     Hyperlipidemia     Hypertension        PAST SURGICAL HISTORY:   Past Surgical History:   Procedure Laterality Date    COLONOSCOPY  2012    polyp,diverticulosis    COLONOSCOPY N/A 2019    COLONOSCOPY DIAGNOSTIC performed by Maryam Ruiz MD at [address] [place] [address]      CORONARY ARTERY BYPASS GRAFT      ENDOSCOPY, COLON, DIAGNOSTIC      UPPER GASTROINTESTINAL ENDOSCOPY N/A 2019    EGD BIOPSY performed by [facility]am Ruiz MD at [facility] ENDOSCOPY       FAMILY HISTORY:   Family History   Problem Relation Age of Onset    Heart Disease Mother     Heart Disease Father     Cancer Sister     Cancer Brother        SOCIAL HISTORY:   Social History     Socioeconomic History    Marital status:      Spouse name: Not on file    Number of children: Not on file    Years of education: Not on file    Highest education level: Not on file   Occupational History    Not on file   Social Needs    Financial resource strain: Not on file    Food insecurity:     Worry: Not on file     Inability: Not on file    Transportation needs:     Medical: Not on file     Non-medical: Not on file   Tobacco Use    Smoking status: Former Smoker     Types: Cigarettes     Last attempt to quit: 1989     Years since quittin.4    Smokeless tobacco: Never Used   Substance and Sexual Activity    Alcohol use: No    Drug use: No    Sexual activity: Not on file   Lifestyle    Physical activity:     Days per week: Not on file     Minutes per session: Not on file    Stress: Not on file   Relationships    Social connections:     Talks on phone: Not on file     Gets together: Not on file     Attends Restoration service: Not on file     Active member of club or organization: Not on file     Attends meetings of clubs or organizations: Not on file     Relationship status: Not on file    Intimate partner violence:     Fear of current or ex partner: Not on file     Emotionally abused: Not on file     Physically abused: Not on file     Forced sexual activity: Not on file   Other Topics Concern    Not on file   Social History Narrative    Not on file     Social History     Tobacco Use   Smoking Status Former Smoker    Types: Cigarettes    Last attempt to quit: 1989    Years since quittin.4   Smokeless Tobacco Never Used     Social History     Substance and Sexual Activity   Alcohol Use No     Social History     Substance and Sexual Activity   Drug Use No         HOME MEDICATIONS:  Prior to Admission medications    Medication Sig Start Date End Date Taking?  Authorizing Provider   ferrous sulfate 325 (65 Fe) MG EC tablet Take 325 mg by mouth daily (with breakfast)   Yes Historical Provider, MD to bleed  Respiratory ROS:   SOB ,cough   Cardiovascular ROS:   No CP,No Palpitation   Gastrointestinal ROS:   No Gi bleed,no nausea or vomiting      - Musculoskeletal ROS:      - no joint swelling ,no joint pain   Neurological ROS:     -no weakness or numbness    Dermatological ROS:   No skin rash ,no urticaria     PHYSICAL EXAMINATION:     VITAL SIGNS:  /66   Pulse 70   Temp 98 °F (36.7 °C) (Temporal)   Resp 18   Ht 5' 7\" (1.702 m)   Wt 140 lb (63.5 kg)   SpO2 95%   BMI 21.93 kg/m²   Wt Readings from Last 3 Encounters:   01/18/20 140 lb (63.5 kg)   12/06/19 137 lb (62.1 kg)   10/01/19 160 lb (72.6 kg)     Temp Readings from Last 3 Encounters:   01/18/20 98 °F (36.7 °C) (Temporal)   12/06/19 97.8 °F (36.6 °C)   10/03/19 98 °F (36.7 °C) (Temporal)     TMAX:  BP Readings from Last 3 Encounters:   01/18/20 113/66   12/06/19 (!) 99/58   10/03/19 (!) 154/94     Pulse Readings from Last 3 Encounters:   01/18/20 70   12/06/19 67   10/03/19 70           INTAKE/OUTPUTS:  I/O last 3 completed shifts:   In: 120 [P.O.:120]  Out: 900 [Urine:900]    Intake/Output Summary (Last 24 hours) at 1/18/2020 1917  Last data filed at 1/18/2020 1653  Gross per 24 hour   Intake 120 ml   Output 1100 ml   Net -980 ml       General Appearance: alert and oriented to person, place and time, well-developed and   well-nourished, in no acute distress   Eyes: pupils equal, round, and reactive to light, extraocular eye movements intact, conjunctivae normal and sclera anicteric   Neck: neck supple and non tender without mass, no thyromegaly, no thyroid nodules and no cervical adenopathy   Pulmonary/Chest:rhonchi bilateral   Cardiovascular: normal rate, regular rhythm, normal S1 and S2, no murmurs, rubs, clicks or gallops, distal pulses intact, no carotid bruits, no murmurs, no gallops, no carotid bruits and no JVD   Abdomen: obese, soft, non-tender, non-distended, normal bowel sounds, no masses or organomegaly   Extremities:No edema or protein-calorie malnutrition ([facility] [address]               ASSESSMENT:  1.) Right infrahilar mass ,source seems lung but it      could be esophogeal   2. )Anemia   3.)A Fib/CAD on Plavix and eliquis   4.)Possible COPD   5.)Weight loss      PLAN:  *patient with no doubt has malignancy ,I believe it should be easily seen with EBUS and even transbronchial,    *-Since he is not surgical candidate so ,will just need tissue and stage with PET scan   *-he is on Plavix and Eliquis ,so we have to wait 5 days and then do it   *-I will schedule him on wednesday/Thursday as this will give 5 days ,  *_He can go home and come for procedure as OP    *- Cardiology to help with risk assessment for procedure and to address risk since we are stopping anticoagulation and anti platelets ,actually he can continue eliquis and stop it on Monday      will discuss with Dr Mamie Moncada     Thank you Dr Mamie Moncada very much for allowing me to participate in the care of this pleasant patient , should you have any questions ,please do not hesitate to contact me         [address] [facility]     NOTE: This report was transcribed using voice recognition software. Every effort was made to ensure accuracy; however, inadvertent computerized transcription errors may be present.

## 2020-01-19 NOTE — CONSULTS
CTS Consult    Patient name: Hero Rosa    Reason for consult: Chest mass    Referring Physician: Dr. Rick Kitchen    Primary Care Physician: Raffy Russell MD    Date of service: 1/19/2020    Chief Complaint: weakness    HPI: 79year old male presented with weight loss, weakness, fatigue and decreased appetite. He reports he's lost around 20lbs over the past 3 months. He was also found to have a leukocytosis. He also complains of headache. He says nothing makes these symptoms better. He denies CP, N/V, hemoptysis or prior history of lung cancer. CT scan shows a large RLL mass.     Allergies: No Known Allergies    Home medications:    Current Facility-Administered Medications   Medication Dose Route Frequency Provider Last Rate Last Dose    acetaminophen (TYLENOL) tablet 650 mg  650 mg Oral Q4H PRN Meenakshi Freedman MD   650 mg at 01/19/20 0000    allopurinol (ZYLOPRIM) tablet 150 mg  150 mg Oral Daily Elvira Mcarthur APRN - CNS   150 mg at 01/18/20 1114    atorvastatin (LIPITOR) tablet 20 mg  20 mg Oral Daily Elvira Mcarthur APRN - CNS   20 mg at 01/18/20 1113    NIFEdipine (PROCARDIA XL) extended release tablet 30 mg  30 mg Oral Daily Elvira Mcarthur, APRN - CNS   30 mg at 01/18/20 1113    sucralfate (CARAFATE) tablet 1 g  1 g Oral BID Elvira Mcarthur APRN - CNS   1 g at 01/18/20 1617    ferrous sulfate tablet 325 mg  325 mg Oral Daily with breakfast Elvira Mcarthur APRN - CNS   325 mg at 01/17/20 0848    mirtazapine (REMERON) tablet 15 mg  15 mg Oral Nightly Elvira Mcarthur APRN - CNS   15 mg at 01/18/20 2045    sodium chloride flush 0.9 % injection 10 mL  10 mL Intravenous 2 times per day Elvira Mcarthur APRN - CNS   10 mL at 01/18/20 2045    sodium chloride flush 0.9 % injection 10 mL  10 mL Intravenous PRN Elvira Mcarthur APRN - CNS   10 mL at 01/18/20 0853    magnesium hydroxide (MILK OF MAGNESIA) 400 MG/5ML suspension 30 mL  30 mL Oral Daily PRN Elvira Mcarthur APRN - CNS        ondansetron (ZOFRAN) injection 4 mg  4 mg Intravenous Q6H PRN Samaria Carrillo APRN - CNS           Past Medical History:  Past Medical History:   Diagnosis Date    Anxiety     CAD (coronary artery disease)     Gout     Hyperlipidemia     Hypertension        Past Surgical History:  Past Surgical History:   Procedure Laterality Date    COLONOSCOPY  2012    polyp,diverticulosis    COLONOSCOPY N/A 2019    COLONOSCOPY DIAGNOSTIC performed by Ottoniel Prieto MD at [place] [address] WITH STENT PLACEMENT      CORONARY ARTERY BYPASS GRAFT      ENDOSCOPY, COLON, DIAGNOSTIC      UPPER GASTROINTESTINAL ENDOSCOPY N/A 2019    EGD BIOPSY performed by Ottoniel Prieto MD at [facility] History:  Social History     Socioeconomic History    Marital status:      Spouse name: Not on file    Number of children: Not on file    Years of education: Not on file    Highest education level: Not on file   Occupational History    Not on file   Social Needs    Financial resource strain: Not on file    Food insecurity:     Worry: Not on file     Inability: Not on file    Transportation needs:     Medical: Not on file     Non-medical: Not on file   Tobacco Use    Smoking status: Former Smoker     Types: Cigarettes     Last attempt to quit: 1989     Years since quittin.4    Smokeless tobacco: Never Used   Substance and Sexual Activity    Alcohol use: No    Drug use: No    Sexual activity: Not on file   Lifestyle    Physical activity:     Days per week: Not on file     Minutes per session: Not on file    Stress: Not on file   Relationships    Social connections:     Talks on phone: Not on file     Gets together: Not on file     Attends Oriental orthodox service: Not on file     Active member of club or organization: Not on file     Attends meetings of clubs or organizations: Not on file     Relationship status: Not on file    Intimate

## 2020-01-23 NOTE — PROGRESS NOTES
1635-Patient and family verbaize understanding of given discharge instructions. Patient dresses to go home  [phone]- Patient discharged in stable condition.
Admitted to pre op, questions answered and oriented to events of the day, wife at bedside.  Demar GARCIA
Patient admitted to [facility] from PACU. Cart locked and in low position. Side rails are up. Call light is within reach. Patient and family oriented to [facility] routine proir to discharge.
Post-op chest X ray report states no pneumothorax.
morning of your procedure, you may call the pre-op area if you have concerns about your blood sugar [phone]. [] Use your inhalers the morning of surgery. Bring your emergency inhaler with you day of surgery. [x] Follow physician instructions regarding any blood thinners you may be taking. Wife states he has been off the eliquis and plavix since hospitalization. WHAT TO EXPECT:  [x] The day of surgery you will be greeted and checked in by the [facility].  In addition, you will be registered in the [place] by a Patient Access Representative. Please bring your photo ID and insurance card. A nurse will greet you in accordance to the time you are needed in the pre-op area to prepare you for surgery. Please do not be discouraged if you are not greeted in the order you arrive as there are many variables that are involved in patient preparation. Your patience is greatly appreciated as you wait for your nurse. Please bring in items such as: books, magazines, newspapers, electronics, or any other items  to occupy your time in the waiting area. []  Delays may occur with surgery and staff will make a sincere effort to keep you informed of delays. If any delays occur with your procedure, we apologize ahead of time for your inconvenience as we recognize the value of your time.

## 2020-01-23 NOTE — H&P
Pulmonary [facility]   Pulmonary Consult/Progress Note :   Patient: Arnoldo Jennings   MRN: [id number]   : 1940   Date of Admission: .2020 3:20 PM   Consulting Physician:Dr Yobany Cedeno   Reason for Consultation:Lung mass   CC : SOB ,weight loss   HPI:   Mr Mychal Mar is 79year old male with 40 PPY smoking history who presented with general weakness and anemia and following a 3-month history of rapid weight loss associated with feeling of fullness trying to eat meals as well as recurrent cephalgia. He notes that he is lost nearly 22 pounds over the past 3 months. There is no vomiting ,He also denies any hemoptysis or change in his bowel habits. He denies any chest discomfort presently. He Does have a history of paroxysmal atrial fibrillation as well as significant coronary artery disease for which he underwent previous CABGS and subsequent catheter-based coronary intervention. a CT scan suggests a lung tumor encroaching upon the right atrium.    Past Medical History                                                    PAST SURGICAL HISTORY:   Past Surgical History                                                                               FAMILY HISTORY:   Family History                                                  SOCIAL HISTORY:   Social History                                                                                                                                                                                                                                                                                                       Social History          Tobacco Use   Smoking Status Former Smoker    Types: Cigarettes    Last attempt to quit: 1989    Years since quittin.4   Smokeless Tobacco Never Used     Social History         Substance and Sexual Activity   Alcohol Use No     Social History         Substance and Sexual Activity   Drug Use No     HOME MEDICATIONS: Home Medications                                                                                                            CURRENT MEDICATIONS:     Current Hospital Medications        IV MEDICATIONS:           ALLERGIES:   No Known Allergies   REVIEW OF SYSTEMS:   General ROS:   +weight loss ,+ fatigue   ENT ROS:   No Sore throat ,no lymphoadenopathy,no nasal stuffiness   Hematological and Lymphatic ROS:   No ecchymosis ,no tendency to bleed   Respiratory ROS:   SOB ,cough   Cardiovascular ROS:   No CP,No Palpitation   Gastrointestinal ROS:   No Gi bleed,no nausea or vomiting   - Musculoskeletal ROS:   - no joint swelling ,no joint pain   Neurological ROS:   -no weakness or numbness   Dermatological ROS:   No skin rash ,no urticaria   PHYSICAL EXAMINATION:   VITAL SIGNS:   /66  Pulse 70  Temp 98 °F (36.7 °C) (Temporal)  Resp 18  Ht 5' 7\" (1.702 m)  Wt 140 lb (63.5 kg)  SpO2 95%  BMI 21.93 kg/m²       Wt Readings from Last 3 Encounters:   01/18/20 140 lb (63.5 kg)   12/06/19 137 lb (62.1 kg)   10/01/19 160 lb (72.6 kg)         Temp Readings from Last 3 Encounters:   01/18/20 98 °F (36.7 °C) (Temporal)   12/06/19 97.8 °F (36.6 °C)   10/03/19 98 °F (36.7 °C) (Temporal)     TMAX:       BP Readings from Last 3 Encounters:   01/18/20 113/66   12/06/19 (!) 99/58   10/03/19 (!) 154/94         Pulse Readings from Last 3 Encounters:   01/18/20 70   12/06/19 67   10/03/19 70     INTAKE/OUTPUTS:   I/O last 3 completed shifts:    In: 120 [P.O.:120]   Out: 900 [Urine:900]     Intake/Output Summary (Last 24 hours) at 1/18/2020 1917   Last data filed at 1/18/2020 1653       Gross per 24 hour   Intake 120 ml   Output 1100 ml   Net -980 ml     General Appearance: alert and oriented to person, place and time, well-developed and   well-nourished, in no acute distress   Eyes: pupils equal, round, and reactive to light, extraocular eye movements intact, conjunctivae normal and sclera anicteric   Neck: neck supple and non tender without mass, no thyromegaly, no thyroid nodules and no cervical adenopathy   Pulmonary/Chest:rhonchi bilateral   Cardiovascular: normal rate, regular rhythm, normal S1 and S2, no murmurs, rubs, clicks or gallops, distal pulses intact, no carotid bruits, no murmurs, no gallops, no carotid bruits and no JVD   Abdomen: obese, soft, non-tender, non-distended, normal bowel sounds, no masses or organomegaly   Extremities:No edema or cyanosis   Musculoskeletal: normal range of motion, no joint swelling, deformity or tenderness   Neurologic: reflexes normal and symmetric, no cranial nerve deficit noted   LABS/IMAGING:   CBC:         Lab Results   Component Value Date    WBC 13.2 (H) 01/17/2020    HGB 9.5 (L) 01/18/2020    HCT 32.5 (L) 01/18/2020    MCV 83.0 01/17/2020     01/17/2020    LYMPHOPCT 1.8 (L) 01/16/2020    RBC 3.70 (L) 01/17/2020    MCH 24.1 (L) 01/17/2020    MCHC 29.0 (L) 01/17/2020    RDW 16.2 (H) 01/17/2020    NEUTOPHILPCT 94.7 (H) 01/16/2020    MONOPCT 2.6 01/16/2020    BASOPCT 0.2 01/16/2020    NEUTROABS 17.29 (H) 01/16/2020    LYMPHSABS 0.36 (L) 01/16/2020    MONOSABS 0.55 01/16/2020    EOSABS 0.16 01/16/2020    BASOSABS 0.00 01/16/2020            Recent Labs    01/18/20   0657 01/17/20   1415 01/17/20   0715 01/16/20   1823   WBC --  --  13.2* 18.2*   HGB 9.5* --  8.9* 9.5*   HCT 32.5* 30.5* 30.7* 32.8*   MCV --  --  83.0 83.7   PLT --  --  308 403     BMP:       Recent Labs    01/17/20   0715      K 4.8      CO2 25   BUN 33*   CREATININE 1.2     MG: No results found for: MG   Ca/Phos:         Lab Results   Component Value Date    CALCIUM 10.6 (H) 01/17/2020     Amylase: No results found for: AMYLASE   Lipase: No results found for: LIPASE   LIVER PROFILE: No results for input(s): AST, ALT, LIPASE, BILIDIR, BILITOT, ALKPHOS in the last 72 hours. Invalid input(s): AMYLASE, ALB  PT/INR: No results for input(s): PROTIME, INR in the last 72 hours.   APTT: No results for input(s): APTT in the last 72 hours. Cardiac Enzymes:         Lab Results   Component Value Date    TROPONINI 0.01 10/01/2019       PROBLEM LIST:       Patient Active Problem List   Diagnosis    Coronary artery disease    Hypertension    Hyperlipidemia    Olecranon bursitis of left elbow    Chronic pain of right knee    Left hip pain    Bradycardia    Atrial fibrillation and flutter (HCC)    Symptomatic bradycardia    Leukocytosis    Severe protein-calorie malnutrition (HCC)     ASSESSMENT:   1.) Right infrahilar mass ,source seems lung but it could be esophogeal   2. )Anemia   3.)A Fib/CAD on Plavix and eliquis   4.)Possible COPD   5.)Weight loss   PLAN:   *patient with no doubt has malignancy ,I believe it should be easily seen with EBUS and even transbronchial,   *-Since he is not surgical candidate so ,will just need tissue and stage with PET scan   *-he is on Plavix and Eliquis ,so we have to wait 5 days and then do it   *-I will schedule him on wednesday/Thursday as this will give 5 days ,   *_He can go home and come for procedure as OP   *- Cardiology to help with risk assessment for procedure and to address risk since we are stopping anticoagulation and anti platelets ,actually he can continue eliquis and stop it on Monday   will discuss with Dr Holly Moore   Thank you Dr Holly Moore very much for allowing me to participate in the care of this pleasant patient , should you have any questions ,please do not hesitate to contact me     Risk of stroke and bleed discussed again  Agreed to poceed with bronch ebus  [address]

## 2020-01-23 NOTE — ANESTHESIA PRE PROCEDURE
Department of Anesthesiology  Preprocedure Note       Name:  Ghassan Hall   Age:  79 y.o.  :  1940                                          MRN:  [id number]         Date:  2020      Surgeon: Ascencion Conway):  Keshia Ross MD    Procedure: EBUS BRONCHOSCOPY (N/A )    Medications prior to admission:   Prior to Admission medications    Medication Sig Start Date End Date Taking? Authorizing Provider   ferrous sulfate 325 (65 Fe) MG EC tablet Take 325 mg by mouth daily (with breakfast)   Yes Historical Provider, MD   mirtazapine (REMERON) 15 MG tablet Take 15 mg by mouth nightly   Yes Historical Provider, MD   sucralfate (CARAFATE) 1 GM tablet Take 1 tablet by mouth 2 times daily 19  Yes Kody Moran MD   olmesartan (BENICAR) 40 MG tablet Take 1 tablet by mouth daily 10/3/19  Yes Nay Iglesias MD   NIFEdipine (ADALAT CC) 30 MG extended release tablet Take 1 tablet by mouth daily 10/4/19  Yes Nay Iglesias MD   allopurinol (ZYLOPRIM) 300 MG tablet Take 150 mg by mouth daily as needed    Yes Historical Provider, MD   atorvastatin (LIPITOR) 20 MG tablet Take 20 mg by mouth daily   Yes Historical Provider, MD       Current medications:    No current facility-administered medications for this encounter.         Allergies:  No Known Allergies    Problem List:    Patient Active Problem List   Diagnosis Code    Coronary artery disease I25.10    Hypertension I10    Hyperlipidemia E78.5    Olecranon bursitis of left elbow M70.22    Chronic pain of right knee M25.561, G89.29    Left hip pain M25.552    Bradycardia R00.1    Atrial fibrillation and flutter (HCC) I48.91, I48.92    Symptomatic bradycardia R00.1    Leukocytosis D72.829    Severe protein-calorie malnutrition (HCC) E43       Past Medical History:        Diagnosis Date    Anxiety     CAD (coronary artery disease)     Gout     Hyperlipidemia     Hypertension        Past Surgical History:        Procedure Laterality Date    01/17/2020    CALCIUM 10.6 01/17/2020    BILITOT 0.5 12/18/2019    ALKPHOS 126 12/18/2019    AST 28 12/18/2019    ALT 29 12/18/2019       POC Tests: No results for input(s): POCGLU, POCNA, POCK, POCCL, POCBUN, POCHEMO, POCHCT in the last 72 hours. Coags: No results found for: PROTIME, INR, APTT    HCG (If Applicable): No results found for: PREGTESTUR, PREGSERUM, HCG, HCGQUANT     ABGs: No results found for: PHART, PO2ART, YWF1TDG, CQO1VWA, BEART, H2GSMBLL     Type & Screen (If Applicable):  No results found for: LABABO, [address]    Narrative & Impression EKG 10/01/19    Atrial flutter  Nonspecific ST abnormality  Abnormal ECG  No previous ECGs available  Confirmed by Mayur Shirley ([id number]) on 10/1/2019 2:44:00 PM     CT scan 01/18/20    Impression   Large bulky malignant appearing bronchocentric expansile   mass in the root of the right lower lobe/adjacent medial mediastinum.       Primary bronchocentric lung malignancy is considered.             Anesthesia Evaluation  Patient summary reviewed and Nursing notes reviewed no history of anesthetic complications:   Airway: Mallampati: I  TM distance: >3 FB   Neck ROM: full  Mouth opening: > = 3 FB Dental:    (+) upper dentures and lower dentures  Comment: Patient has partial lower and full upper dentures, removed. Patient has poor dentition on remaining teeth.     Pulmonary:Negative Pulmonary ROS breath sounds clear to auscultation                             Cardiovascular:    (+) hypertension:, CAD:, CABG/stent (Triple Bypass, and has 1 stent placed a couple years after CABG, Patient stated was over 20 years ago.):, dysrhythmias (Patient has history of A-Flutter): atrial flutter, MENDOZA: after ambulating 1 flight of stairs, murmur,       ECG reviewed  Rhythm: regular  Rate: normal           Beta Blocker:  Not on Beta Blocker         Neuro/Psych:   Negative Neuro/Psych ROS  (+) headaches (Patient gets headaches daily.):,             GI/Hepatic/Renal:             Endo/Other:

## 2020-01-26 NOTE — DISCHARGE SUMMARY
Take 150 mg by mouth dailyHistorical Med      atorvastatin (LIPITOR) 20 MG tablet Take 20 mg by mouth dailyHistorical Med             Time Spent on discharge is more than 45 minutes in the examination, evaluation, counseling and review of medications and discharge plan.       Signed:    Claude Bailon MD   1/26/2020

## 2020-02-14 PROBLEM — R62.51 FAILURE TO THRIVE (0-17): Status: ACTIVE | Noted: 2020-01-01

## 2020-02-14 NOTE — PROGRESS NOTES
Bronch follow up in office today; dtrs present with pt. Pt denies any respiratory issues however he is very weak. Pt reports no appetite and not eating over this past week. Minimal intake oral liquids or solid food over past week and receiving Chemo treatment, Dtr very concerned with pt weakness and overall status. Per Dr. Roberta Helton Pt taken to admitting office for admit under him with PCP covering is Dr. Latonia Saavedra/ Dr. Roberta Helton spoke to him while pt is in office. Dtr's agreeable to this plan for admit for dehydration, failure to thrive. Pt to follow up prn.

## 2020-02-15 NOTE — H&P
Pulmonary [facility]                             Pulmonary Consult/Progress Note :          Patient: Shaylee Shell  MRN: [id number]  : 1940      Date of Admission: .2020  3:40 PM          Reason for Consultation: Squamous cell lung cancer  CC : SOB ,weight loss   HPI:   Mr Winkler Nephew is 79year old male with 40 PPY smoking history who presented with general weakness and anemia and following a 3-month history of rapid weight loss associated with feeling of fullness trying to eat meals as well as recurrent cephalgia. He was  diagnosed with squamous cell carcinoma through   EBUS and we did station 7 that was + for Squmous cells , we  and he started on chemotherapy where he received his second dose few days ago        He notes that he is lost nearly 22 pounds over the past 3 months. There is no vomiting ,He also denies any hemoptysis or change in his bowel habits. He denies any chest discomfort presently. He Does have a history of paroxysmal atrial fibrillation as well as significant coronary artery disease for which he underwent previous CABGS and subsequent catheter-based coronary intervention.         Has no appetite, he came into the office with general weakness seroma he has no fever or chills but he is not able to eat or drink,    I Felt that he has dehydration and failure to thrive with need to admit him to the hospital        PAST MEDICAL HISTORY:     Past Medical History:   Diagnosis Date    Anxiety     CAD (coronary artery disease)     Gout     Hyperlipidemia     Hypertension        PAST SURGICAL HISTORY:   Past Surgical History:   Procedure Laterality Date    BRONCHOSCOPY N/A 2020    EBUS BRONCHOSCOPY performed by Ernestina Lund MD at [id number] [place] [address]  2020    BRONCHOSCOPY W/EBUS FNA performed by Ernestina Lund MD at [address] [facility] COLONOSCOPY  2012    polyp,diverticulosis    COLONOSCOPY N/A 2019    COLONOSCOPY DIAGNOSTIC performed by Danielito Magdaleno MD at [address] CORONARY ARTERY BYPASS GRAFT      ENDOSCOPY, COLON, DIAGNOSTIC      UPPER GASTROINTESTINAL ENDOSCOPY N/A 2019    EGD BIOPSY performed by Danielito Magdaleno MD at [address] HISTORY:   Family History   Problem Relation Age of Onset    Heart Disease Mother     Heart Disease Father     Cancer Sister     Cancer Brother        SOCIAL HISTORY:   Social History     Socioeconomic History    Marital status:      Spouse name: Not on file    Number of children: Not on file    Years of education: Not on file    Highest education level: Not on file   Occupational History    Not on file   Social Needs    Financial resource strain: Not on file    Food insecurity:     Worry: Not on file     Inability: Not on file    Transportation needs:     Medical: Not on file     Non-medical: Not on file   Tobacco Use    Smoking status: Former Smoker     Types: Cigarettes     Last attempt to quit: 1989     Years since quittin.5    Smokeless tobacco: Never Used   Substance and Sexual Activity    Alcohol use: No    Drug use: No    Sexual activity: Not on file   Lifestyle    Physical activity:     Days per week: Not on file     Minutes per session: Not on file    Stress: Not on file   Relationships    Social connections:     Talks on phone: Not on file     Gets together: Not on file     Attends Druze service: Not on file     Active member of club or organization: Not on file     Attends meetings of clubs or organizations: Not on file     Relationship status: Not on file    Intimate partner violence:     Fear of current or ex partner: Not on file     Emotionally abused: Not on file     Physically abused: Not on file     Forced sexual activity: Not on file   Other Topics Concern    Not on file   Social History Narrative    Not on file     Social 325 mg, Oral, Daily with breakfast  mirtazapine (REMERON) tablet 15 mg, 15 mg, Oral, Nightly  NIFEdipine (PROCARDIA XL) extended release tablet 30 mg, 30 mg, Oral, Daily  losartan (COZAAR) tablet 25 mg, 25 mg, Oral, Daily  oxyCODONE-acetaminophen (PERCOCET) 5-325 MG per tablet 1 tablet, 1 tablet, Oral, Q6H PRN  prochlorperazine (COMPAZINE) tablet 5 mg, 5 mg, Oral, Q6H PRN  sucralfate (CARAFATE) tablet 1 g, 1 g, Oral, BID  0.9 % sodium chloride infusion, , Intravenous, Continuous    IV MEDICATIONS:   sodium chloride 75 mL/hr at 02/14/20 1955       ALLERGIES:  No Known Allergies    REVIEW OF SYSTEMS:  General ROS:  +weight loss ,+ fatigue     ENT ROS:   No Sore throat ,no lymphoadenopathy,no nasal stuffiness     Hematological and Lymphatic ROS:   No ecchymosis ,no tendency to bleed  Respiratory ROS:   SOB ,cough   Cardiovascular ROS:   No CP,No Palpitation   Gastrointestinal ROS:   No Gi bleed,no nausea or vomiting      - Musculoskeletal ROS:      - no joint swelling ,no joint pain   Neurological ROS:     -no weakness or numbness    Dermatological ROS:   No skin rash ,no urticaria     PHYSICAL EXAMINATION:     VITAL SIGNS:  BP (!) 84/54   Pulse 69   Temp 98.1 °F (36.7 °C) (Temporal)   Resp 16   Ht 5' 7\" (1.702 m)   Wt 127 lb (57.6 kg)   BMI 19.89 kg/m²   Wt Readings from Last 3 Encounters:   02/14/20 127 lb (57.6 kg)   02/14/20 127 lb (57.6 kg)   01/23/20 139 lb (63 kg)     Temp Readings from Last 3 Encounters:   02/14/20 98.1 °F (36.7 °C) (Temporal)   01/23/20 98 °F (36.7 °C)   01/18/20 99 °F (37.2 °C) (Temporal)     TMAX:  BP Readings from Last 3 Encounters:   02/14/20 (!) 84/54   01/23/20 (!) 105/57   01/23/20 117/62     Pulse Readings from Last 3 Encounters:   02/14/20 69   02/14/20 82   01/23/20 68           INTAKE/OUTPUTS:  No intake/output data recorded.   No intake or output data in the 24 hours ending 02/14/20 8608    General Appearance: alert and oriented to person, place and time, well-developed and

## 2020-02-15 NOTE — PROGRESS NOTES
Contacted Dr. Payam Bass via [employer] regarding Oncology Consult. Left a message with answering service. Awaiting response.

## 2020-02-15 NOTE — PROGRESS NOTES
Left a message via [employer] for Dr. Sawyer Malcolm answering service for Cardiology Consult, he is known with this doctor. Waiting response.

## 2020-02-15 NOTE — PROGRESS NOTES
Sent Dr. Angélica Smith a message via perfect serve. Pt has had several bouts of diarrhea and family is requesting anti-diarrheal.  According to family, he had diarrhea today and yesterday. According to [employer], he had diarrhea over night as well.

## 2020-02-16 NOTE — CONSULTS
performed by Adarsh Sandoval MD at [facility] 37      CORONARY ARTERY BYPASS GRAFT      ENDOSCOPY, COLON, DIAGNOSTIC      UPPER GASTROINTESTINAL ENDOSCOPY N/A 2019    EGD BIOPSY performed by Adarsh Sandoval MD at [facility] 59 Status   Relation Name Status    Mother     [address] A[address] Father     L[address] A[address] Sister      Brother          Prior to Admission medications    Medication Sig Start Date End Date Taking? Authorizing Provider   apixaban (ELIQUIS) 5 MG TABS tablet Take 1 tablet every 12 hours by oral route. Historical Provider, MD   clopidogrel (PLAVIX) 75 MG tablet Take 1 tablet every day by oral route for 90 days.     Historical Provider, MD   oxyCODONE-acetaminophen (PERCOCET) 5-325 MG per tablet take 1 tablet by mouth every 6 hours if needed for pain 2/10/20   Historical Provider, MD   prochlorperazine (COMPAZINE) 10 MG tablet  2/3/20   Historical Provider, MD   ferrous sulfate 325 (65 Fe) MG EC tablet Take 325 mg by mouth daily (with breakfast)    Historical Provider, MD   mirtazapine (REMERON) 15 MG tablet Take 15 mg by mouth nightly    Historical Provider, MD   sucralfate (CARAFATE) 1 GM tablet Take 1 tablet by mouth 2 times daily 19   Adarsh Sandoval MD   olmesartan (BENICAR) 40 MG tablet Take 1 tablet by mouth daily 10/3/19   Luis Armando Abdi MD   NIFEdipine (ADALAT CC) 30 MG extended release tablet Take 1 tablet by mouth daily 10/4/19   Luis Armando Abdi MD   allopurinol (ZYLOPRIM) 300 MG tablet Take 150 mg by mouth daily as needed     Historical Provider, MD   atorvastatin (LIPITOR) 20 MG tablet Take 20 mg by mouth daily    Historical Provider, MD        Social History     Socioeconomic History    Marital status:      Spouse name: Not on file    Number of children: Not on file    Years of education: Not on file    Highest education level: Not on file   Occupational History    Not on file   Social Needs    Financial resource strain: Not on file    Food insecurity:     Worry: Not on file     Inability: Not on file    Transportation needs:     Medical: Not on file     Non-medical: Not on file   Tobacco Use    Smoking status: Former Smoker     Types: Cigarettes     Last attempt to quit: 1989     Years since quittin.5    Smokeless tobacco: Never Used   Substance and Sexual Activity    Alcohol use: No    Drug use: No    Sexual activity: Not on file   Lifestyle    Physical activity:     Days per week: Not on file     Minutes per session: Not on file    Stress: Not on file   Relationships    Social connections:     Talks on phone: Not on file     Gets together: Not on file     Attends Mu-ism service: Not on file     Active member of club or organization: Not on file     Attends meetings of clubs or organizations: Not on file     Relationship status: Not on file    Intimate partner violence:     Fear of current or ex partner: Not on file     Emotionally abused: Not on file     Physically abused: Not on file     Forced sexual activity: Not on file   Other Topics Concern    Not on file   Social History Narrative    Not on file       No Known Allergies    The patient's medical records have been reviewed contingent on availability        Review of Systems:   · As per HPI: Days a weakness and asthenia  · Anorexia with weight loss  · Weight loss predating the anorexia  · Mild cognitive impairment family indicates some confusion at times        Physical Examination:      Wt Readings from Last 3 Encounters:   20 127 lb (57.6 kg)   20 127 lb (57.6 kg)   20 139 lb (63 kg)     Temp Readings from Last 3 Encounters:   20 97.5 °F (36.4 °C) (Oral)   20 98 °F (36.7 °C)   20 99 °F (37.2 °C) (Temporal)     BP Readings from Last 3 Encounters:   20 131/72   20 (!) 105/57   20 117/62     Pulse Readings from Last 3 Encounters:   20 69   20 82 0908    oxyCODONE-acetaminophen (PERCOCET) 5-325 MG per tablet 1 tablet  1 tablet Oral Q6H PRN Darren Caceres MD   1 tablet at 02/15/20 2027    prochlorperazine (COMPAZINE) tablet 5 mg  5 mg Oral Q6H PRN Jesse Duenas MD        sucralfate (CARAFATE) tablet 1 g  1 g Oral BID Jesse Duenas MD   1 g at 02/16/20 0909       Current  Infusions   dextrose 5 % and 0.45 % NaCl 100 mL/hr at 02/16/20 0909       Prn Meds  allopurinol, oxyCODONE-acetaminophen, prochlorperazine    Radiology Review:  No orders to display         ASSESSMENT:  Active diagnoses treated at this admission:  · Dehydration  · Anorexia  · Protein calorie malnutrition severe  · 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  · Large necrotic mass right hilar feeding right heart border  · 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    Problem list:  Patient Active Problem List   Diagnosis    Coronary artery disease    Hypertension    Hyperlipidemia    Olecranon bursitis of left elbow    Chronic pain of right knee    Left hip pain    Bradycardia    Atrial fibrillation and flutter (HCC)    Symptomatic bradycardia    Leukocytosis    Severe protein-calorie malnutrition (HCC)    Failure to thrive (0-17)       PLAN:  Reviewed the latest admission in January where the initial diagnosis was made  Surgical intervention not planned  The size and location of the tumor based on the CT description is ominous  Supportive care at this time  Assess for adrenocortical insufficiency        See  Orders  Mannie Cho MD, Carlos Bell [employer] Board of Internal Medicine  Diplomate, Geriatric Medicine, [address] of Internal Medicine  9:52 AM  2/16/2020

## 2020-02-16 NOTE — PROGRESS NOTES
Musculoskeletal: normal range of motion, no joint swelling, deformity or tenderness   Neurologic: reflexes normal and symmetric, no cranial nerve deficit noted    LABS/IMAGING:    CBC:  Lab Results   Component Value Date    WBC 2.1 (L) 02/14/2020    HGB 8.5 (L) 02/14/2020    HCT 29.4 (L) 02/14/2020    MCV 80.3 02/14/2020     02/14/2020    LYMPHOPCT 7.8 (L) 02/14/2020    RBC 3.66 (L) 02/14/2020    MCH 23.2 (L) 02/14/2020    MCHC 28.9 (L) 02/14/2020    RDW 16.3 (H) 02/14/2020    NEUTOPHILPCT 84.5 (H) 02/14/2020    MONOPCT 3.4 02/14/2020    BASOPCT 0.9 02/14/2020    NEUTROABS 1.81 02/14/2020    LYMPHSABS 0.17 (L) 02/14/2020    MONOSABS 0.06 (L) 02/14/2020    EOSABS 0.04 (L) 02/14/2020    BASOSABS 0.02 02/14/2020       Recent Labs     02/14/20  1827 01/18/20  0657 01/17/20  1415 01/17/20  0715   WBC 2.1*  --   --  13.2*   HGB 8.5* 9.5*  --  8.9*   HCT 29.4* 32.5* 30.5* 30.7*   MCV 80.3  --   --  83.0     --   --  308       BMP:   Recent Labs     02/14/20  1827      K 5.5*      CO2 23   BUN 32*   CREATININE 1.6*       MG: No results found for: MG  Ca/Phos:   Lab Results   Component Value Date    CALCIUM 8.9 02/14/2020     Amylase: No results found for: AMYLASE  Lipase: No results found for: LIPASE  LIVER PROFILE:   Recent Labs     02/14/20  1827   AST 18   ALT 19   BILITOT 1.1   ALKPHOS 99       PT/INR: No results for input(s): PROTIME, INR in the last 72 hours. APTT: No results for input(s): APTT in the last 72 hours.     Cardiac Enzymes:  Lab Results   Component Value Date    TROPONINI 0.01 10/01/2019             PROBLEM LIST:  Patient Active Problem List   Diagnosis    Coronary artery disease    Hypertension    Hyperlipidemia    Olecranon bursitis of left elbow    Chronic pain of right knee    Left hip pain    Bradycardia    Atrial fibrillation and flutter (HCC)    Symptomatic bradycardia    Leukocytosis    Severe protein-calorie malnutrition (HCC)    Failure to thrive (0-17) ASSESSMENT:  1.)Squamous cell lung cancer  2- Dehydration with elevated creatinine   2.)Anemia   3.)A Fib/CAD on Plavix and eliquis   4.)Possible COPD   5.)Weight loss      PLAN:      Continue IVF  Consult Medicine again   His leukopenia, I am worried about suppression from chemotherapy we will get oncology    May benefit from octreotide. If diarhea Continue    On  Plavix and Eliquis ,  Work up for infection    Blood culture X 2  Cbc ,cmp ,crp,procal etc  Oncology consult  Medicine consult   jose FORD [id number] [facility]     NOTE: This report was transcribed using voice recognition software. Every effort was made to ensure accuracy; however, inadvertent computerized transcription errors may be present.

## 2020-02-16 NOTE — PLAN OF CARE
Problem: Falls - Risk of:  Goal: Will remain free from falls  Description  Will remain free from falls  2/16/2020 0308 by Georgiana De La Vega RN  Outcome: Met This Shift     Problem: Risk for Impaired Skin Integrity  Goal: Tissue integrity - skin and mucous membranes  Description  Structural intactness and normal physiological function of skin and  mucous membranes.   2/16/2020 0308 by Georgiana De La Vega RN  Outcome: Met This Shift     Problem: MOBILITY  Goal: Ability to tolerate increased activity will improve  Description  Ability to tolerate increased activity will improve     Outcome: Met This Shift

## 2020-02-16 NOTE — CONSULTS
[facility]  Dr. Dyan Wills      Patient Name: Taisha Lang  YOB: 1940  PCP: Conrado Rubi MD   Referring Provider:      Reason for Consultation: No chief complaint on file. History of Present Illness: This pt is a very pleasant 80 yo male who follows with Dr. Daisy Belcher for [facility] w/ Large R Lung cancer > 10 cm, with mediastinal extension, separate tumor nodule RUL probable intracardiac metastasis by PET scan 1/30/20, probable Lt. adrenal metastasis by CT and s/p bronchoscopy w/ Dr. Shrei Doll 1/23/20. He recently started C1 carbo/abraxane/keytruda and had D8 abraxane on 2/10. He is now admitted with FTT, weight loss, nausea, and diarrhea. His CBC shows anemia with Hgb 8.3 and leukopenia with WBC 1.9 but ANC of 1.58.  Platelets normal at 441    Diagnostic Data:     Past Medical History:   Diagnosis Date    Anxiety     CAD (coronary artery disease)     Gout     Hyperlipidemia     Hypertension        Patient Active Problem List    Diagnosis Date Noted    Failure to thrive (0-17) 02/14/2020    Severe protein-calorie malnutrition ([address] 01/18/2020    Leukocytosis 01/16/2020    Atrial fibrillation and flutter ([facility] [address] 10/03/2019    Symptomatic bradycardia 10/03/2019    Bradycardia 10/01/2019    Left hip pain 03/13/2018    Chronic pain of right knee 09/20/2017    Olecranon bursitis of left elbow 06/20/2017    Coronary artery disease 02/01/2013    Hypertension 02/01/2013    Hyperlipidemia 02/01/2013        Past Surgical History:   Procedure Laterality Date    BRONCHOSCOPY N/A 1/23/2020    EBUS BRONCHOSCOPY performed by Zora Noble MD at [address] [facility]  1/23/2020    BRONCHOSCOPY W/EBUS FNA performed by Zora Noble MD at [address] COLONOSCOPY  7/26/2012    polyp,diverticulosis    COLONOSCOPY N/A 12/6/2019    COLONOSCOPY DIAGNOSTIC performed by Maty Wall MD at [place] [address] rigors. Eyes: No changes in vision, discharge, or pain  ENT: No Headaches, hearing loss or vertigo. No mouth sores or sore throat. No change in taste or smell. Cardiovascular: No chest discomfort, dyspnea on exertion, palpitations or loss of consciousness. or phlebitis. Respiratory: Has no cough or wheezing, Has no sputum production. Has no hemoptysis, Has no pleuritic pain, . Gastrointestinal: +nausea and diarrhea  Genitourinary: Patient acknowledges no dysuria, trouble voiding, or hematuria. No nocturia or increased frequency. Musculoskeletal: No gait disturbance, weakness or joint complaints. Integumentary: No rash or pruritis. Neurological: +HA  Psychiatric: No anxiety, or depression. Endocrine: No temperature intolerance. No excessive thirst, fluid intake, or urination. No tremor. Hematologic/Lymphatic: No abnormal bruising or bleeding, blood clots or swollen lymph nodes. Allergic/Immunologic: No nasal congestion or hives. Objective  /72   Pulse 69   Temp 97.5 °F (36.4 °C) (Oral)   Resp 16   Ht 5' 7\" (1.702 m)   Wt 127 lb (57.6 kg)   SpO2 97%   BMI 19.89 kg/m²     Physical Exam:   Performance Status:  General: AAO to person, place, time, uncomfortable   Head and neck : PERRLA, EOMI . Sclera non icteric. Oropharynx : Clear  Neck: no JVD,  no adenopathy  LYMPHATICS : No LAD  Heart: Regular rate and regular rhythm, no murmur  Lungs: Clear to auscultation   Extremities: No edema,no cyanosis, no clubbing. Abdomen: Soft, non-tender;no masses, no organomegaly  Skin:  No rash  Neurologic:Cranial nerves grossly intact. No focal motor or sensory deficits .     Recent Laboratory Data-   Lab Results   Component Value Date    WBC 1.9 (L) 02/16/2020    HGB 8.3 (L) 02/16/2020    HCT 29.2 (L) 02/16/2020    MCV 82.3 02/16/2020     02/16/2020    LYMPHOPCT 7.0 (L) 02/16/2020    RBC 3.55 (L) 02/16/2020    MCH 23.4 (L) 02/16/2020    MCHC 28.4 (L) 02/16/2020    RDW 16.6 (H) 02/16/2020

## 2020-02-16 NOTE — CONSULTS
CARDIOLOGY CONSULTATION    Patient Name:  Arnoldo Jennings    :  1940    Reason for Consultation:   History of coronary artery disease    History of Present Illness:   Arnoldo Jennings returns to [facility] following a 3-month history of rapid weight loss and subsequently diagnosed with an underlying neoplastic process (Squamous cell carcinoma of the lung). He notes that he is lost nearly 22 pounds over the past 3.5 months. He denies any chest discomfort nor significant dyspnea presently. He does have a history of paroxysmal atrial fibrillation as well as significant coronary artery disease for which he underwent previous coronary artery bypass surgery and subsequent catheter-based coronary intervention. He is presently receiving chemotherapy,and has developed profound weakness, dehydration as well as neutropenia. He is now readmitted for further observation and therapeutic intervention as needed. Past Medical History:   has a past medical history of Anxiety, CAD (coronary artery disease), Gout, Hyperlipidemia, and Hypertension. Surgical History:   has a past surgical history that includes Coronary artery bypass graft; Coronary angioplasty with stent; Colonoscopy (2012); Endoscopy, colon, diagnostic; Upper gastrointestinal endoscopy (N/A, 2019); Colonoscopy (N/A, 2019); bronchoscopy (N/A, 2020); and bronchoscopy (2020). Social History:   reports that he quit smoking about 30 years ago. His smoking use included cigarettes. He has never used smokeless tobacco. He reports that he does not drink alcohol or use drugs. Family History:  family history includes Cancer in his brother and sister; Heart Disease in his father and mother. Medications:  Prior to Admission medications    Medication Sig Start Date End Date Taking? Authorizing Provider   apixaban (ELIQUIS) 5 MG TABS tablet Take 1 tablet every 12 hours by oral route.     Historical Provider, MD clopidogrel (PLAVIX) 75 MG tablet Take 1 tablet every day by oral route for 90 days. Historical Provider, MD   oxyCODONE-acetaminophen (PERCOCET) 5-325 MG per tablet take 1 tablet by mouth every 6 hours if needed for pain 2/10/20   Historical Provider, MD   prochlorperazine (COMPAZINE) 10 MG tablet  2/3/20   Historical Provider, MD   ferrous sulfate 325 (65 Fe) MG EC tablet Take 325 mg by mouth daily (with breakfast)    Historical Provider, MD   mirtazapine (REMERON) 15 MG tablet Take 15 mg by mouth nightly    Historical Provider, MD   sucralfate (CARAFATE) 1 GM tablet Take 1 tablet by mouth 2 times daily 12/6/19   Adarsh Sandoval MD   olmesartan (BENICAR) 40 MG tablet Take 1 tablet by mouth daily 10/3/19   Luis Armando Abdi MD   NIFEdipine (ADALAT CC) 30 MG extended release tablet Take 1 tablet by mouth daily 10/4/19   Luis Armando Abdi MD   allopurinol (ZYLOPRIM) 300 MG tablet Take 150 mg by mouth daily as needed     Historical Provider, MD   atorvastatin (LIPITOR) 20 MG tablet Take 20 mg by mouth daily    Historical Provider, MD       Allergies:  Patient has no known allergies. Review of Systems:   · Constitutional: there has been a significant unanticipated weight loss. There's been significant change in energy level, sleep pattern or activity level. No fever chills or rigors. · Eyes: No visual changes or diplopia. No scleral icterus. · ENT: No Headaches, hearing loss or vertigo. No mouth sores or sore throat. No change in taste or smell. · Cardiovascular: No chest discomfort, + dyspnea on exertion, + palpitations, loss of consciousness, no phlebitis, no claudication. · Respiratory: No cough or wheezing, no sputum production. No hemoptysis, pleuritic pain. · Gastrointestinal: No abdominal pain, appetite loss, blood in stools. No change in bowel habits. No hematemesis  · Genitourinary: No dysuria, trouble voiding or hematuria. No nocturia or increased frequency.   · Musculoskeletal:  No gait measuring 8.3 x 9.3 cm in the right infrahilar region and invading the right heart and left atrium concerning for malignancy. There is COPD. There is pneumobilia. The liver is of normal architecture. There is previous cholecystectomy. Spleen, pancreas, and the right adrenal gland appear normal. 1.1 cm left adrenal nodule is present. Small cystic lesions are identified in the kidneys. There is calcification of aorta and degenerative changes in lumbar spine with bilateral pars defect at L5 and a grade 1 spondylolisthesis at L5-S1. Small nonspecific retroperitoneal lymph nodes measuring up to 7 mm are identified. Pelvis. Bladder is unremarkable. Prostate gland is prominent measuring 5.1 x 3.6 cm with  mass effect on the bladder. There is diverticulosis of colon with a mild thickening of the sigmoid colon. The appendix is normal.     A large necrotic mass in the right infrahilar region invading the right heart border concerning for malignancy. Further assessment is recommended. There may be developing left adrenal metastasis. Diverticulosis of colon with a mild thickening of the sigmoid colon which may be due to spasm or mild uncomplicated diverticulitis. ALERT:  THIS IS AN ABNORMAL REPORT     Assessment:    Active Problems:    Failure to thrive (0-17)  Resolved Problems:    * No resolved hospital problems. *      Plan:  Based upon Mr. Giovanni Fulton present clinical presentation , It would seem that the majority of his symptoms are related to his underlying neoplastic state and post chemotherapeutic intervention. We will continue to monitor him and have offered to see him as necessary during this hospitalization as well. I have spent more than 45 minutes face to face with Faustina Bryan reviewing notes and laboratory data with greater than 50% of this time instructing and counseling the patient and his wife and daughter who are at his bedside.   Regarding my findings and recommendations and I have answered all questions as posed to me by . So Romeo. and daughter. Thank you, Conrado Rubi MD for allowing me to consult in the care of this patient. Abhijeet Yanez DO, FACP, FACC, [facility]    NOTE:  This report was transcribed using voice recognition software. Every effort was made to ensure accuracy; however, inadvertent computerized transcription errors may be present.

## 2020-02-16 NOTE — PROGRESS NOTES
Pulmonary [facility]                             Pulmonary Consult/Progress Note :            Reason for Consultation: Squamous cell lung cancer  CC : SOB ,weight loss   HPI:  Doing much better  One time diarrhea  Family around  Less SOB     PHYSICAL EXAMINATION:     VITAL SIGNS:  /72   Pulse 69   Temp 97.5 °F (36.4 °C) (Oral)   Resp 16   Ht 5' 7\" (1.702 m)   Wt 127 lb (57.6 kg)   SpO2 97%   BMI 19.89 kg/m²   Wt Readings from Last 3 Encounters:   02/14/20 127 lb (57.6 kg)   02/14/20 127 lb (57.6 kg)   01/23/20 139 lb (63 kg)     Temp Readings from Last 3 Encounters:   02/16/20 97.5 °F (36.4 °C) (Oral)   01/23/20 98 °F (36.7 °C)   01/18/20 99 °F (37.2 °C) (Temporal)     TMAX:  BP Readings from Last 3 Encounters:   02/16/20 131/72   01/23/20 (!) 105/57   01/23/20 117/62     Pulse Readings from Last 3 Encounters:   02/16/20 69   02/14/20 82   01/23/20 68           INTAKE/OUTPUTS:  I/O last 3 completed shifts:   In: 1862.9 [P.O.:360; I.V.:1502.9]  Out: 300 [Urine:300]    Intake/Output Summary (Last 24 hours) at 2/16/2020 1448  Last data filed at 2/16/2020 1418  Gross per 24 hour   Intake 1982.9 ml   Output 300 ml   Net 1682.9 ml       General Appearance: alert and oriented to person, place and time, well-developed and   well-nourished, in no acute distress   Eyes: pupils equal, round, and reactive to light, extraocular eye movements intact, conjunctivae normal and sclera anicteric   Neck: neck supple and non tender without mass, no thyromegaly, no thyroid nodules and no cervical adenopathy   Pulmonary/Chest:rhonchi bilateral   Cardiovascular: normal rate, regular rhythm, normal S1 and S2, no murmurs, rubs, clicks or gallops, distal pulses intact, no carotid bruits, no murmurs, no gallops, no carotid bruits and no JVD   Abdomen: obese, soft, non-tender, non-distended, normal bowel sounds, no masses or organomegaly   Extremities:No edema or cyanosis  Failure to thrive (0-17)               ASSESSMENT:  1.)Squamous cell lung cancer  2- Dehydration with elevated creatinine   2.)Anemia   3.)A Fib/CAD on Plavix and eliquis   4.)Possible COPD   5.)Weight loss      PLAN:      Continue IVF,will slow down 50 cc   Oncology following   May benefit from octreotide. If diarhea Continue ,discuss with hematology  On  Plavix and Eliquis ,  Work up for infection  Blood culture X 2 negative   Cbc ,cmp ,crp,procal etc all elevated but hold on abx .will hold since he  Is doing great over last 48 h  c diff pneding    [address] [facility]     NOTE: This report was transcribed using voice recognition software. Every effort was made to ensure accuracy; however, inadvertent computerized transcription errors may be present.

## 2020-02-17 NOTE — PLAN OF CARE
Problem: Malnutrition  (NI-5.2)  Goal: Food and/or Nutrient Delivery  Description: ONS BID  Individualized approach for food/nutrient provision.   2/17/2020 1348 by Lily Pal MS, RD, LD  Outcome: Met This Shift

## 2020-02-17 NOTE — PROGRESS NOTES
1/23/2020    EBUS BRONCHOSCOPY performed by Meryle Meres, MD at [address]  1/23/2020    BRONCHOSCOPY W/EBUS FNA performed by Meryle Meres, MD at [address] COLONOSCOPY  7/26/2012    polyp,diverticulosis    COLONOSCOPY N/A 12/6/2019    COLONOSCOPY DIAGNOSTIC performed by Roman Martinez MD at [address] [facility] [address] GRAFT      ENDOSCOPY, COLON, DIAGNOSTIC      UPPER GASTROINTESTINAL ENDOSCOPY N/A 12/6/2019    EGD BIOPSY performed by Roman Martinez MD at [address] [place] [address] Systems:   · As per HPI: Days a weakness and asthenia  · Anorexia with weight loss  · Weight loss predating the anorexia  · Mild cognitive impairment family indicates some confusion at times        Physical Examination:      Wt Readings from Last 3 Encounters:   02/14/20 127 lb (57.6 kg)   02/14/20 127 lb (57.6 kg)   01/23/20 139 lb (63 kg)     Temp Readings from Last 3 Encounters:   02/16/20 98.8 °F (37.1 °C) (Temporal)   01/23/20 98 °F (36.7 °C)   01/18/20 99 °F (37.2 °C) (Temporal)     BP Readings from Last 3 Encounters:   02/16/20 (!) 103/50   01/23/20 (!) 105/57   01/23/20 117/62     Pulse Readings from Last 3 Encounters:   02/16/20 70   02/14/20 82   01/23/20 68       General appearance: Patient appears comfortable and more animated this morning  Skin: Color sallow but texture, turgor normal. No rashes or lesions. Eyes: Conjunctivae/cornea clear. Tessa Sear. Sclera non icteric. Ears: External appearance normal.  Hearing grossly normal  Mouth: Lips and tongue appear normal.   Neck:  Symmetric. No adenopathy. Lungs: Harsh breath sounds and reduced breath sounds throughout both lung fields  Heart: S1 > S2. Rhythm is regular and rate is normal. No gallop rub or murmur. Abdomen: Soft, mildly protuberant, non-tender. BS normal. No masses, organomegaly.  Anatomic contours appear normal.  Extremities: No deformities, edema, or skin discoloration. Musculoskeletal: No unusual pain or swelling. Muscular strength intact. Neuro:   · See normal with no focal symptom or sign  Mental status: Awake, alert, cognizant of person, place, time. Patient appears capable of directing self care   Mood: Affect extremely flat distractible  Gait & balance: not assessed:     Labs     CBC:   Lab Results   Component Value Date    WBC 1.9 02/16/2020    RBC 3.55 02/16/2020    HGB 8.3 02/16/2020    HCT 29.2 02/16/2020     02/16/2020    MCV 82.3 02/16/2020     BMP:    Lab Results   Component Value Date     02/16/2020    K 4.9 02/16/2020    K 4.8 01/17/2020     02/16/2020    CO2 21 02/16/2020    BUN 27 02/16/2020    CREATININE 1.0 02/16/2020    GLUCOSE 118 02/16/2020    CALCIUM 7.9 02/16/2020     Hepatic Function Panel:    Lab Results   Component Value Date    ALKPHOS 99 02/14/2020    AST 18 02/14/2020    ALT 19 02/14/2020    PROT 6.4 02/14/2020    LABALBU 3.4 02/14/2020    LABALBU 5.0 01/27/2012    BILITOT 1.1 02/14/2020     Magnesium:    Lab Results   Component Value Date    MG 2.1 02/16/2020     Cardiac Enzymes:   Lab Results   Component Value Date    TROPONINI 0.01 10/01/2019     LDH:    Lab Results   Component Value Date     01/17/2020     PT/INR:  No results found for: PROTIME, INR  BNP: No results for input(s): BNP in the last 72 hours. TSH: No results found for: TSH   Cardiac Injury Profile: No results for input(s): CKTOTAL, CKMB, CKMBINDEX, TROPONINI in the last 72 hours.    Lipid Profile:   Lab Results   Component Value Date    TRIG 64 09/11/2019    HDL 47 09/11/2019    LDLCALC 78 09/11/2019    CHOL 138 09/11/2019      Hemoglobin A1C: No components found for: HGBA1C   U/A: No results found for: NITRITE, LEUKOCYTESUR, PHUR, WBCUA, RBCUA, BACTERIA, SPECGRAV, BLOODU, GLUCOSEU      ADMISSION SCHEDULED MEDS:   Current Facility-Administered Medications   Medication Dose Route Frequency Provider Last Rate Last Dose    dextrose 5 % and 0.45 % sodium chloride infusion   Intravenous Continuous Jesse Loya MD 50 mL/hr at 02/16/20 2230      allopurinol (ZYLOPRIM) tablet 150 mg  150 mg Oral Daily PRN Jesse Loya MD        apixaban (ELIQUIS) tablet 5 mg  5 mg Oral BID Jesse Loya MD   5 mg at 02/16/20 2108    atorvastatin (LIPITOR) tablet 20 mg  20 mg Oral Daily Jesse Loya MD   20 mg at 02/16/20 0908    clopidogrel (PLAVIX) tablet 75 mg  75 mg Oral Daily Jesse Loya MD   75 mg at 02/16/20 0908    ferrous sulfate tablet 325 mg  325 mg Oral Daily with breakfast Jesse Loya MD   325 mg at 02/16/20 0820    mirtazapine (REMERON) tablet 15 mg  15 mg Oral Nightly Jesse Loya MD   15 mg at 02/16/20 2108    NIFEdipine (PROCARDIA XL) extended release tablet 30 mg  30 mg Oral Daily Jesse Loya MD   30 mg at 02/16/20 0908    losartan (COZAAR) tablet 25 mg  25 mg Oral Daily Jesse Loya MD   25 mg at 02/16/20 0908    oxyCODONE-acetaminophen (PERCOCET) 5-325 MG per tablet 1 tablet  1 tablet Oral Q6H PRN Sofy Haque MD   1 tablet at 02/17/20 0304    prochlorperazine (COMPAZINE) tablet 5 mg  5 mg Oral Q6H PRN Jesse Loya MD        sucralfate (CARAFATE) tablet 1 g  1 g Oral BID Jesse Loya MD   1 g at 02/16/20 1707       Current  Infusions   dextrose 5 % and 0.45 % NaCl 50 mL/hr at 02/16/20 2230       Prn Meds  allopurinol, oxyCODONE-acetaminophen, prochlorperazine    Radiology Review:  No orders to display         ASSESSMENT:  Active diagnoses treated at this admission:  · Dehydration  · Anorexia  · Protein calorie malnutrition severe  · 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  · Large necrotic mass right hilar feeding right heart border  · 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    Problem list:  Patient Active Problem List   Diagnosis    Coronary artery disease    Hypertension    Hyperlipidemia    Olecranon bursitis of left elbow    Chronic pain of right knee    Left hip pain    Bradycardia    Atrial fibrillation and flutter (HCC)    Symptomatic bradycardia    Leukocytosis    Severe protein-calorie malnutrition (HCC)    Failure to thrive (0-17)       PLAN:  Reviewed the latest admission in January where the initial diagnosis was made  Surgical intervention not planned  The size and location of the tumor based on the CT description is ominous  Supportive care at this time  Assess for adrenocortical insufficiency  Reviewed labs  Appears to be slightly improved          See  Orders  Alphonso Alcala MD, Amara More, American Board of Internal Medicine  Diplomate, [address] [place] [address] Rd., [address] of Internal Medicine  8:42 AM  2/17/2020

## 2020-02-17 NOTE — PLAN OF CARE
Problem: Increased nutrient needs (NI-5.1)  Goal: Food and/or Nutrient Delivery  Description : ONS BID  Individualized approach for food/nutrient provision.   Outcome: Met This Shift

## 2020-02-18 NOTE — PROGRESS NOTES
 Hypertension        Past Surgical History:   Procedure Laterality Date    BRONCHOSCOPY N/A 1/23/2020    EBUS BRONCHOSCOPY performed by Ernestina Lund MD at [address] [facility]  1/23/2020    BRONCHOSCOPY W/EBUS FNA performed by Ernestina Lund MD at [address] COLONOSCOPY  7/26/2012    polyp,diverticulosis    COLONOSCOPY N/A 12/6/2019    COLONOSCOPY DIAGNOSTIC performed by Gokul Rosario MD at [facility] GRAFT      ENDOSCOPY, COLON, DIAGNOSTIC      UPPER GASTROINTESTINAL ENDOSCOPY N/A 12/6/2019    EGD BIOPSY performed by Gokul Rosario MD at [address] [place] [address] of Systems:   · As per HPI: Days a weakness and asthenia  · Anorexia with weight loss  · Weight loss predating the anorexia  · Mild cognitive impairment family indicates some confusion at times        Physical Examination:      Wt Readings from Last 3 Encounters:   02/14/20 127 lb (57.6 kg)   02/14/20 127 lb (57.6 kg)   01/23/20 139 lb (63 kg)     Temp Readings from Last 3 Encounters:   02/18/20 98.4 °F (36.9 °C) (Temporal)   01/23/20 98 °F (36.7 °C)   01/18/20 99 °F (37.2 °C) (Temporal)     BP Readings from Last 3 Encounters:   02/18/20 116/62   01/23/20 (!) 105/57   01/23/20 117/62     Pulse Readings from Last 3 Encounters:   02/18/20 70   02/14/20 82   01/23/20 68       General appearance: Patient appears comfortable and more animated this morning  Skin: Color sallow but texture, turgor normal. No rashes or lesions. Eyes: Conjunctivae/cornea clear. Grace Maiers. Sclera non icteric. Mouth: Lips and tongue appear normal.   Lungs: Harsh breath sounds and reduced breath sounds throughout both lung fields  Heart: S1 > S2. Rhythm is regular and rate is normal. No gallop rub or murmur. Abdomen: Soft, mildly protuberant, non-tender. BS normal. No masses, organomegaly.  Anatomic contours appear normal.  Extremities: No deformities, edema, PLAN:  Reviewed the latest admission in January where the initial diagnosis was made  Surgical intervention not planned  The size and location of the tumor based on the CT description is ominous  Supportive care at this time  Assess for adrenocortical insufficiency  Reviewed labs-Hb is relatively stable  Electrolytes are stable  He reports he is eating better  Appears to be slightly improved-may discharge when ok with Pulmonary          See  Orders  Savanah Chavez MD, Mary Mauro, [address] [place] [address] Rd., [address] of Internal Medicine  Diplomate, [address] , [address] [place] [address] Rd., [address] of Internal Medicine  7:39 AM  2/18/2020

## 2020-02-18 NOTE — DISCHARGE INSTR - COC
Continuity of Care Form    Patient Name: Mayte Nicholson   :  1940  MRN:  [id number]    Admit date:  2020  Discharge date:  ***    Code Status Order: Prior   Advance Directives:   [facility] Documentation     Date/Time Healthcare Directive Type of Healthcare Directive Copy in [address] Agent's Name Healthcare Agent's Phone Number    20 1720  No, patient does not have an advance directive for healthcare treatment -- -- -- -- --          Admitting Physician:  Hosea Newton MD  PCP: Mayo San MD    Discharging Nurse: [facility] Unit/Room#: 5210/5210-A  Discharging Unit Phone Number: ***    Emergency Contact:   Extended Emergency Contact Information  Primary Emergency Contact: [facility]  Address: [address] [place] [address] Phone: [phone]  Relation: Spouse  Secondary Emergency Contact: Jase Khalil  Mobile Phone: [phone]  Relation: Child    Past Surgical History:  Past Surgical History:   Procedure Laterality Date    BRONCHOSCOPY N/A 2020    EBUS BRONCHOSCOPY performed by Hosea Newton MD at [address] [place] [address]  2020    BRONCHOSCOPY W/EBUS FNA performed by Hosea Newton MD at [address] [place] [address] COLONOSCOPY  2012    polyp,diverticulosis    COLONOSCOPY N/A 2019    COLONOSCOPY DIAGNOSTIC performed by Brielle Schofield MD at [facility] GRAFT      ENDOSCOPY, COLON, DIAGNOSTIC      UPPER GASTROINTESTINAL ENDOSCOPY N/A 2019    EGD BIOPSY performed by Breille Schofield MD at [address] [place] [address]       Immunization History: There is no immunization history on file for this patient.     Active Problems:  Patient Active Problem List   Diagnosis Code    Coronary artery disease I25.10    Hypertension I10    Hyperlipidemia E78.5    Olecranon bursitis of left elbow M70.22   

## 2020-02-18 NOTE — PROGRESS NOTES
Discharge instructions given to pt and family. Verbalized understanding. No new meds ordered. Pt going home with family.

## 2020-02-18 NOTE — DISCHARGE SUMMARY
[address]  Department of Internal Medicine  Pulmonary [facility]  Division of Pulmonary, Critical Care & Sleep Medicine    Discharge Summary    Patient ID:  Ghassan Hall     [id number]     42 y.o.     1940     79 y.o. Admit Date:    2/14/2020      Discharge Date & Time:  2/18/2020  8:14 PM     Admitting Physician:  Keshia Ross MD     Discharge Diagnosis:Dehydration       Consults:Medicine ,Oncology     Procedures:  None   Brief Summary of Patient's Course: Patient with recent diagnosis of lung cancer started on chemoradiation, he had 1 week before this admission last chemo and started feeling weak, he also developed diarrhea he presented with acute kidney injury with elevated creatinine 1.6 from baseline borderline he was having leukopenia, he was hydrated and his kidney function and his vitals has improved significantly also his energy has improved as well his hemoglobin also dropped but it was told related to the keep he was seen by medicine team and by oncology and the plan to continue follow-up as outpatient      Discharge Instructions: Activity Limitations: activity as tolerated       Diet:    regular diet    Follow Up Instructions:   Please call your primary physician and ask for a follow up appointment    Call  to confirm a follow up appointment the [facility] [address] at [phone]. Return to the Emergency Department for new symptoms, worse symptoms,  or if concerned. For Complete list of Medications/Prescriptions see After Visit Summary   Darío Burk   Home Medication Instructions GRM:[id number]    Printed on:02/22/20 0672   Medication Information                      allopurinol (ZYLOPRIM) 300 MG tablet  Take 150 mg by mouth daily as needed              apixaban (ELIQUIS) 5 MG TABS tablet  Take 1 tablet every 12 hours by oral route.              atorvastatin (LIPITOR) 20 MG tablet  Take 20 mg by

## 2020-02-18 NOTE — PLAN OF CARE
Problem: Falls - Risk of:  Goal: Will remain free from falls  Description  Will remain free from falls  Outcome: Completed  Goal: Absence of physical injury  Description  Absence of physical injury  Outcome: Completed

## 2020-02-18 NOTE — CARE COORDINATION
2/18, SW met with patient at bedside along with patient's wife. Patient noted discharge plan remains home with no needs. Patient noted having FWW at home and does not feel he has any needs at this time. Patient noted he will have help at home with family and daughter to be transportation once medically cleared for discharge. SW/CM to follow for any further needs.

## 2020-03-04 PROBLEM — C34.91 CARCINOMA OF RIGHT LUNG (HCC): Status: ACTIVE | Noted: 2020-01-01

## 2020-03-04 NOTE — PROGRESS NOTES
Chief Complaint:   Chief Complaint   Patient presents with    Consultation     new pt. port insertion         HPI: Patient came to the office with his family, including 2 daughters, Po Mcknight being the older 1, for preoperative discussion prior to insertion of venous port, recommended for chemotherapy by his oncologist Dr. Loren Leventhal for metastatic carcinoma of the right lung    Patient overall doing better, has underlying coronary artery disease, with history of atrial fibrillation, angioplasty and stent placement, currently on Eliquis and Plavix, last dose was this morning      Patient denies any focal lateralizing neurological symptoms like loss of speech, vision or loss of function of extremity    Patient can walk a few blocks slowly, and denies any symptoms of rest pain    No Known Allergies    Current Outpatient Medications   Medication Sig Dispense Refill    apixaban (ELIQUIS) 5 MG TABS tablet Take 1 tablet every 12 hours by oral route.  clopidogrel (PLAVIX) 75 MG tablet Take 1 tablet every day by oral route for 90 days.  oxyCODONE-acetaminophen (PERCOCET) 5-325 MG per tablet take 1 tablet by mouth every 6 hours if needed for pain      prochlorperazine (COMPAZINE) 10 MG tablet       ferrous sulfate 325 (65 Fe) MG EC tablet Take 325 mg by mouth daily (with breakfast)      mirtazapine (REMERON) 15 MG tablet Take 15 mg by mouth nightly      sucralfate (CARAFATE) 1 GM tablet Take 1 tablet by mouth 2 times daily 60 tablet 3    olmesartan (BENICAR) 40 MG tablet Take 1 tablet by mouth daily 30 tablet 3    NIFEdipine (ADALAT CC) 30 MG extended release tablet Take 1 tablet by mouth daily 30 tablet 3    allopurinol (ZYLOPRIM) 300 MG tablet Take 150 mg by mouth daily as needed       atorvastatin (LIPITOR) 20 MG tablet Take 20 mg by mouth daily       No current facility-administered medications for this visit.         Past Medical History:   Diagnosis Date    Anxiety     CAD (coronary artery disease)     extremities. No focal motor or sensory deficits      Extremities:  Both feet are warm to touch. The color of both feet is normal.        Pulses Right  Left    Brachial 3 3    Radial    3=normal   Femoral 2 2  2=diminished   Popliteal    1=barely palpable   Dorsalis pedis    0=absent   Posterior tibial    4=aneurysmal             Other pertinent information:1. The past medical records were reviewed. 2.  The CT scan of the chest was personally reviewed by me    3. The medical records from his oncologist office were reviewed    Assessment:    1. Carcinoma of right lung ([address]              Plan:       I had a long detailed discussion the patient, both his daughters, all options, risks benefits and alternatives were explained, recommended them to proceed with insertion of venous port as instructed by his oncologist Dr. Antonio Huynh    Patient was scheduled for surgery, this Friday, as such patient was instead to completely stop the Plavix today and tomorrow including Friday and will resume the Eliquis postoperatively on Saturday if no bleeding issues    The risks, benefits, options and alternatives were clearly explained including bleeding, clotting, infection, arterial, venous, nerve, cardiopulmonary complications, chances of major complications which they understand and consent for surgery. All  questions were answered          Indicated follow-up: Return if symptoms worsen or fail to improve.         CC to Dr. Antonio Huynh

## 2020-03-05 NOTE — H&P
Chief Complaint:        Chief Complaint   Patient presents with    Consultation       new pt. port insertion            HPI: Patient came to the office with his family, including 2 daughters, Leighton Chardon being the older 1, for preoperative discussion prior to insertion of venous port, recommended for chemotherapy by his oncologist Dr. Fadi Gutierrez for metastatic carcinoma of the right lung     Patient overall doing better, has underlying coronary artery disease, with history of atrial fibrillation, angioplasty and stent placement, currently on Eliquis and Plavix, last dose was this morning        Patient denies any focal lateralizing neurological symptoms like loss of speech, vision or loss of function of extremity     Patient can walk a few blocks slowly, and denies any symptoms of rest pain     No Known Allergies     Current Facility-Administered Medications          Current Outpatient Medications   Medication Sig Dispense Refill    apixaban (ELIQUIS) 5 MG TABS tablet Take 1 tablet every 12 hours by oral route.        clopidogrel (PLAVIX) 75 MG tablet Take 1 tablet every day by oral route for 90 days.        oxyCODONE-acetaminophen (PERCOCET) 5-325 MG per tablet take 1 tablet by mouth every 6 hours if needed for pain        prochlorperazine (COMPAZINE) 10 MG tablet          ferrous sulfate 325 (65 Fe) MG EC tablet Take 325 mg by mouth daily (with breakfast)        mirtazapine (REMERON) 15 MG tablet Take 15 mg by mouth nightly        sucralfate (CARAFATE) 1 GM tablet Take 1 tablet by mouth 2 times daily 60 tablet 3    olmesartan (BENICAR) 40 MG tablet Take 1 tablet by mouth daily 30 tablet 3    NIFEdipine (ADALAT CC) 30 MG extended release tablet Take 1 tablet by mouth daily 30 tablet 3    allopurinol (ZYLOPRIM) 300 MG tablet Take 150 mg by mouth daily as needed         atorvastatin (LIPITOR) 20 MG tablet Take 20 mg by mouth daily          No current facility-administered medications for this visit.

## 2020-03-05 NOTE — PROGRESS NOTES
you are to spend the night in the hospital.     PARKING INSTRUCTIONS:   [x] Arrival Time:__0830___________  · [x] Parking lot '\"I\"  is located on [facility] (the corner of [facility] and [facility]). To enter, press the button and the gate will lift. A free token will be provided to exit the lot. One car per patient is allowed to park in this lot. All other cars are to park on [address] [place] [address] Street either in the parking garage or the handicap lot. [] To reach the [facility] lobby from [address] [place] [address], upon entering the hospital, take elevator B to the 3rd floor. EDUCATION INSTRUCTIONS:      [] Knee or hip replacement booklet & exercise pamphlets given. [] [address] placed in chart. [] Pre-admission Testing educational folder given  [] Incentive Spirometry,coughing & deep breathing exercises reviewed. []Medication information sheet(s)   []Fluoroscopy-Xray used in surgery reviewed with patient. Educational pamphlet placed in chart. []Pain: Post-op pain is normal and to be expected. You will be asked to rate your pain from 0-10(a zero is not acceptable-education is needed). Your post-op pain goal is:  [] Ask your nurse for your pain medication. [] Joint camp offered. [] Joint replacement booklets given. [] Other:___________________________    MEDICATION INSTRUCTIONS:   [x]Bring a complete list of your medications, please write the last time you took the medicine, give this list to the nurse. [x] Take the following medications the morning of surgery with 1-2 ounces of water: SEE MED LIST  [] Stop herbal supplements and vitamins 5 days before your surgery. [] DO NOT take any diabetic medicine the morning of surgery. Follow instructions for insulin the day before surgery. [] If you are diabetic and your blood sugar is low or you feel symptomatic, you may drink 1-2 ounces of apple juice or take a glucose tablet.   The morning of your procedure, you may call the

## 2020-03-06 PROBLEM — R00.1 BRADYCARDIA: Status: RESOLVED | Noted: 2019-01-01 | Resolved: 2020-01-01

## 2020-03-06 PROBLEM — R00.1 SYMPTOMATIC BRADYCARDIA: Status: RESOLVED | Noted: 2019-01-01 | Resolved: 2020-01-01

## 2020-03-06 PROBLEM — D72.829 LEUKOCYTOSIS: Status: RESOLVED | Noted: 2020-01-01 | Resolved: 2020-01-01

## 2020-03-06 NOTE — ANESTHESIA PRE PROCEDURE
had no PAT visit       Abdominal:           Vascular: negative vascular ROS. Anesthesia Plan      MAC     ASA 4     (20g right forearm)  Induction: intravenous. Anesthetic plan and risks discussed with patient. Use of blood products discussed with patient whom consented to blood products. Plan discussed with CRNA and attending. Beau Pepe RN   3/6/2020    Pt seen, examined, chart reviewed, plan discussed.   [facility]  3/6/2020  10:19 AM

## 2020-03-06 NOTE — ANESTHESIA POSTPROCEDURE EVALUATION
Department of Anesthesiology  Postprocedure Note    Patient: Lita Cotton  MRN: [id number]  YOB: 1940  Date of evaluation: 3/6/2020  Time:  4:23 PM     Procedure Summary     Date:  03/06/20 Room / Location:  [facility] OR  / [facility]    Anesthesia Start:  1107 Anesthesia Stop:  9387    Procedure:  PORT INSERTION (N/A ) Diagnosis:  (LUNG CANCER)    Surgeon:  Harrison Landis MD Responsible Provider:  Caity Bangura MD    Anesthesia Type:  MAC ASA Status:  4          Anesthesia Type: MAC    Oli Phase I: Oli Score: 10    Oli Phase II: Oli Score: 10    Last vitals: Reviewed and per EMR flowsheets.        Anesthesia Post Evaluation    Patient location during evaluation: PACU  Patient participation: complete - patient participated  Level of consciousness: awake and alert  Airway patency: patent  Nausea & Vomiting: no nausea and no vomiting  Complications: no  Cardiovascular status: hemodynamically stable and blood pressure returned to baseline  Respiratory status: acceptable  Hydration status: euvolemic

## 2020-03-06 NOTE — OP NOTE
DATE OF PROCEDURE:  3/6/2020      SURGEON:  Jermey Reyna M.D.      ASSISTANT:  Britney Beasley C.F.A., C.S.T. PREOPERATIVE DIAGNOSIS:  Carcinoma of lung      POSTOPERATIVE DIAGNOSIS:  Same      OPERATION:  Insertion of a venous port. ANESTHESIA:  LMAC. ESTIMATED BLOOD LOSS:Minimal      COMPLICATIONS:None      PROCEDURE:  With the patient in the supine position, the right side of the   neck and chest were prepped and draped in the usual fashion. One percent   Xylocaine was used for local anesthesia. A horizontal incision of 2 cm in length was made over the right subclavian   fossa where a subcutaneous pocket of 2 x 2 cm was created for the placement   of the venous port. Next, a 1-cm incision was made over the right external   jugular vein and deepened down to the platysma where the vein was dissected   free and ligated proximally with 3-0 silk. Through a venotomy, the tip of   the catheter was passed into the superior vena cava and the right atrium   under fluoroscopy, after which the vein was ligated distally with 3-0 silk. The catheter was tunneled subcutaneously and connected to the port, from   where blood could be aspirated. After which, the port was flushed with   heparin solution. Hemostasis was secured. The incision was closed in layers by approximating the subcu with 3-0 Vicryl   and the skin with 5-0 Vicryl, dressed with Dermabond, and the patient was   transferred back to the recovery room in stable condition.   The blood loss   was minimal.        Parag Boothe M.D.

## 2020-03-12 PROBLEM — Z98.890 POST-OPERATIVE STATE: Status: ACTIVE | Noted: 2020-01-01

## 2020-04-11 PROBLEM — Z98.890 POST-OPERATIVE STATE: Status: RESOLVED | Noted: 2020-01-01 | Resolved: 2020-01-01

## 2020-04-19 PROBLEM — R06.02 SHORTNESS OF BREATH: Status: ACTIVE | Noted: 2020-01-01

## 2020-04-19 PROBLEM — J69.0 ACUTE ASPIRATION PNEUMONIA (HCC): Status: ACTIVE | Noted: 2020-01-01

## 2020-04-19 NOTE — ED NOTES
Blood cultures obtained from left hand, per policy. Set two of two drawn at this time.                Lali Desir RN  04/19/20 5591

## 2020-04-19 NOTE — LETTER
Whats most important for you to know is that your Medicare rights and benefits wont change because your health care provider is participating in [address] [place]. Medicare will keep covering all of your medically necessary services. Even though Medicare will pay your doctor in a different way under BPCI Advanced, how much you have to pay wont change. Health care providers and suppliers who are enrolled in Medicare will submit their Medicare claims like they always have. Youll have all the same Medicare rights and protections, including the right to choose which hospital, doctor, or other health care provider you see. If you dont want to get care from a health care provider whos participating in [address] [place], then youll have to choose a different health care provider whos not participating in the Model. How can I give feedback about my health care? Medicare might ask you to take a voluntary survey about the services and care you received from [address] [place] [address] during your hospital stay or outpatient procedure and for a specific period of time afterwards. You can decide whether you want to take the voluntary survey, but if you do, itll help Medicare make BPCI Advanced and the care of other Medicare patients better. If you have concerns or complaints about your care, you can:   · Talk to your doctor or health care provider. · Contact your Beneficiary and Family Centered Care Quality Improvement   Organization [facility]). You can get your BFCC-QIOs phone number  at  Medicare.gov/contacts or by calling 1-800-MEDICARE. TTY users can call  5-218.336.2685. Where can I learn more about BPCI Advanced? Learn more about BPCI Advanced at https://innovation.cms.gov/initiatives/bpci-advanced/:  · A list of all the hospitals and physician group practices in the country participating in [address] [place].

## 2020-04-19 NOTE — LETTER
Beneficiary Notification Letter  BPCI Advanced     Your Doctor or [address],    We wanted to let you know that your health care provider, [address] [place] [address] Renetta, has volunteered to take part in our [facility] for [facility] & Medicaid Services (CMS) Bundled Payments for [facility] (BPCI Advanced). This doesnt change your Medicare rights or benefits and you dont need to do anything. What are bundled payments? A bundled payment combines, or bundles together, payments that Medicare makes to your health care providers for the many different kinds of medical services you might get in a specific time period. In BPCI Advanced, this time period could include a hospital inpatient stay or outpatient procedure, plus 90 days. Why would Medicare bundle payments? Bundled payments are thought of as a value-based way to pay because health care providers are responsible for both the quality and cost of medical care they give. This is a relatively new way of paying health care providers compared to thefee-for-service way Medicare has traditionally paid, where providers are paid separately for each service they provide. Bundled payments encourage these providers to work together to provide better, more coordinated care during your hospital stay, or outpatient procedure, and through your recovery. What does BPCI Advance mean for me? Youre more likely to get even better care when hospitals, doctors, and other health care providers work together. In BPCI Advanced, hospitals, doctors, and other health care providers may be rewarded for providing better, more coordinated health care. Medicare will watch BPCI Advanced participants closely to make sure that you and other patients keep getting efficient, high quality care. What do I need to know about BPCI Advanced? Whats most important for you to know is that your Medicare rights and benefits wont change because your health care provider is participating in [address] [place]. Medicare will keep covering all of your medically necessary services. Even though Medicare will pay your doctor in a different way under BPCI Advanced, how much you have to pay wont change. Health care providers and suppliers who are enrolled in Medicare will submit their Medicare claims like they always have. Youll have all the same Medicare rights and protections, including the right to choose which hospital, doctor, or other health care provider you see. If you dont want to get care from a health care provider whos participating in [address] [place], then youll have to choose a different health care provider whos not participating in the Model. How can I give feedback about my health care? Medicare might ask you to take a voluntary survey about the services and care you received from [address] [place] [address] during your hospital stay or outpatient procedure and for a specific period of time afterwards. You can decide whether you want to take the voluntary survey, but if you do, itll help Medicare make BPCI Advanced and the care of other Medicare patients better. If you have concerns or complaints about your care, you can:   · Talk to your doctor or health care provider. · Contact your Beneficiary and Family Centered Care Quality Improvement   Organization [facility]). You can get your BFCC-QIOs phone number  at  Medicare.gov/contacts or by calling 1-800-MEDICARE. TTY users can call  6-130.494.5272. Where can I learn more about BPCI Advanced? Learn more about BPCI Advanced at https://innovation.cms.gov/initiatives/bpci-advanced/:  · A list of all the hospitals and physician group practices in the country participating in [address] [place].

## 2020-04-20 PROBLEM — E43 SEVERE PROTEIN-CALORIE MALNUTRITION (HCC): Chronic | Status: ACTIVE | Noted: 2020-01-01

## 2020-04-20 NOTE — CARE COORDINATION
SOCIAL WORK/DISCHARGE PLANNING;  Pt seen in room for care coordination. He is alert and oriented, pleasant. Pt lives with wife in a one floor home. Pt reported that he was independent at home until recently. He states the chemo treatments has been taking it out of him. He states he goes weekly for chemo. His family transport him. Pt has a walker at home but has not needed it before now. Pt does not have any services at this time. He states plan is home with wife at discharge. Per pt, his two children come over daily and also can assist. Offered pt Kirsty Zimmer but he does not feel it's needed. His plan is home with family at discharge. His pcp is Dr. Linda Moritz and his pharmacy is [facility]. Social Work will assist as needed.   Get HERRMANN

## 2020-04-20 NOTE — PLAN OF CARE
Problem: Malnutrition  (NI-5.2)  Goal: Food and/or Nutrient Delivery  Description: Individualized approach for food/nutrient provision.   Outcome: Met This Shift

## 2020-04-20 NOTE — ED PROVIDER NOTES
time of transport. MDM:  Patient presented from his facility with several days of worsening fatigue, anorexia and shortness of breath. On arrival, the patient was mildly hypotensive with remaining vital signs within normal limits. Physical exam was as documented above. DDX included, but was not limited to: sequelae of lung CA, PE, PNA, ACS, tamponade, costochondritis, sepsis, influenza, COVID-19    Given the patient's clinical presentation, history and exam, the decision was made to further evaluate his complaints with EKG, labs and imaging. The patient's work-up revealed an elevated procalcitonin, lymphopenia and signs of aspiration PNA on CT scan; no PE. The patient was given IVF and zosyn whlie he was in the ER. Based on the findings of our work up and the fact that the patient had been decompensating for several days prior to arrival, the decision was made to admit him for further evaluation and management. This patient's ED course included: a personal history and physicial examination, re-evaluation prior to disposition, multiple bedside re-evaluations, IV medications, cardiac monitoring and continuous pulse oximetry    Diagnosis:  1. Aspiration pneumonia of right lower lobe, unspecified aspiration pneumonia type ([address]    2. Fatigue, unspecified type    3. Anorexia    4. Hypocalcemia    5. Hypoalbuminemia    6. Chronic anticoagulation    7. Atrial fibrillation with RVR (HCC)        Disposition:  Patient's disposition: Admit to telemetry  Patient's condition is stable.        Michelle Rolle DO  Resident  04/20/20 9473      ATTENDING PROVIDER ATTESTATION:     Rabia Bustamante presented to the emergency department for evaluation of Fatigue (weakness, decreased appetite x3 days, currently undergoing chemo) and Shortness of Breath (with exertion)    I have reviewed and discussed the case, including pertinent history (medical, surgical, family and social) and exam findings with the Resident and the Nurse assigned to [facility]. I have personally performed and/or participated in the history, exam, medical decision making, and procedures and agree with all pertinent clinical information. I have reviewed my findings and recommendations with [facility] and members of family present at the time of disposition. Vitals:    04/22/20 0515   BP:    Pulse:    Resp:    Temp: 97.5 °F (36.4 °C)   SpO2:          Oxygen Saturation Interpretation: Abnormal    The cardiac monitor revealed A fib with a heart rate in the 120s as interpreted by me. The cardiac monitor was ordered secondary to the patient's heart rate and to monitor the patient for dysrhythmia. CPT 08217    The patients available past medical records and past encounters were reviewed. Sepsis Re-examination  4/19/20   1500 EDT          Vital Signs:   Vitals:    04/21/20 1630 04/21/20 2045 04/22/20 0030 04/22/20 0515   BP: 124/74 117/67 125/68    Pulse: 84 75 81    Resp: 15 16 16    Temp: 98.2 °F (36.8 °C) 98.6 °F (37 °C) 99 °F (37.2 °C) 97.5 °F (36.4 °C)   TempSrc: Temporal Temporal Temporal Oral   SpO2: 98% 97% 98%    Weight:       Height:         Card/Pulm:  Rhythm: rapid rate. Heart Sounds: Normal S1, S2. rhonchi. Capillary Refill: normal.  Radial Pulse:  present 2+. Skin:  Warm. MDM:     IDr. Jasper in the primary provider of record    Please note that the withdrawal or failure to initiate urgent interventions for this patient would likely result in a life threatening deterioration or permanent disability. Accordingly this patient received 33 minutes of critical care time, excluding separately billable procedures. My findings/plan: The primary encounter diagnosis was Aspiration pneumonia of right lower lobe, unspecified aspiration pneumonia type ([address]  Diagnoses of Fatigue, unspecified type, Anorexia, Hypocalcemia, Hypoalbuminemia, Chronic anticoagulation, and Atrial fibrillation with RVR ([address] were also

## 2020-04-20 NOTE — H&P
Admission History and Physical                                                                                    Ivon Stoll MD, FACP                   Patient Name: Debra Cuenca                   Age:  79 y.o. Gender:   male    CC: Extreme weakness    HPI: This patient presented to the emergency room with complaints of extreme weakness. He has been undergoing chemotherapy for lung cancer. Urgency reported that this patient was short of breath. The patient states that he has been having dyspnea on exertion and that this is not particularly new. He states he gets weak every time he has the chemotherapy. This morning he denies having any shortness of breath. He does not have cough but does have dyspnea on exertion. He states his weakness is better. Subsequent to the patient interview his CT scan was reviewed  White cell count is 9.2  Hemoglobin was 9.1 and now 7.2  He was dehydrated and this is likely due to rehydration and hemodilution.   Review of his previous records indicate that this blood count is still within the range of his presentations between 7 and 9    He is anticoagulated and he is receiving iron        Past Medical History:   Diagnosis Date    Anxiety     CAD (coronary artery disease)     Carcinoma of right lung ([address] 3/4/2020    Carcinoma of right lung ([address] 3/4/2020    Gout     Hyperlipidemia     Hypertension        Past Surgical History:   Procedure Laterality Date    BRONCHOSCOPY N/A 1/23/2020    EBUS [address]ON[address] Crystal Ulloa MD [address]  1/23/2020    BRONCHOSCOPY W/EBUS FNA performed by Crystal Ulloa MD at [address] COLONOSCOPY  7/26/2012    polyp,diverticulosis    COLONOSCOPY N/A 12/6/2019    COLONOSCOPY DIAGNOSTIC performed by Jacqueline Vargas MD at [address] [facility] since quittin.7    Smokeless tobacco: Never Used   Substance and Sexual Activity    Alcohol use: No    Drug use: No    Sexual activity: None   Lifestyle    Physical activity     Days per week: None     Minutes per session: None    Stress: None   Relationships    Social connections     Talks on phone: None     Gets together: None     Attends Mosque service: None     Active member of club or organization: None     Attends meetings of clubs or organizations: None     Relationship status: None    Intimate partner violence     Fear of current or ex partner: None     Emotionally abused: None     Physically abused: None     Forced sexual activity: None   Other Topics Concern    None   Social History Narrative    None       No Known Allergies    The patient's medical records have been reviewed contingent on availability        Review of Systems:   · General: He does acknowledge weakness and fatigue but no fevers or chills  · Eyes: No visual changes or diplopia. No swelling or pain. · ENT: No Headaches, tinnitus or vertigo. No mouth sores or sore throat. · Cardiovascular: No chest pain, positive for dyspnea on exertion, negative for palpitations, syncope. · Respiratory: No cough or wheezing, hemoptysis, pleuritic pain. He has dyspnea on exertion  · Gastrointestinal: No abdominal pain, anorexia, hematochezia, melena, hematemesis or change in bowels. · Genitourinary: No dysuria, trouble voiding, or hematuria. No change in urination. · Musculoskeletal:  No joint pain or inflammation. No limb weakness. · Integumentary: No rash or pruritis. No abnormal pigmentation,  masses, hair or nail changes  · Neurological: No unusual headaches,  paresthesias. No change in gait, balance, coordination, memory, mentation or behavior. · Psychiatric: No anxiety, or depression. Mood and affect reported as normal  · Endocrine: No temperature intolerance. No polydipsia or polyuria.   · Hematologic: No abnormal bruising or Wilma Swanson MD        ferrous sulfate (IRON 325) tablet 325 mg  325 mg Oral Daily with breakfast Wilma Swanson MD        HYDROmorphone (DILAUDID) tablet 2 mg  2 mg Oral Q4H Wilma Swanson MD   2 mg at 04/20/20 0419    sucralfate (CARAFATE) tablet 1 g  1 g Oral BID Wilma Swanson MD   1 g at 04/19/20 2217    sodium chloride flush 0.9 % injection 10 mL  10 mL Intravenous 2 times per day Wilma Swanson MD   10 mL at 04/19/20 2216    sodium chloride flush 0.9 % injection 10 mL  10 mL Intravenous PRN Wilma Swanson MD        acetaminophen (TYLENOL) tablet 650 mg  650 mg Oral Q6H PRN Wilma Swanson MD        Or    acetaminophen (TYLENOL) suppository 650 mg  650 mg Rectal Q6H PRN Wilma Swanson MD        polyethylene glycol [facility]) packet 17 g  17 g Oral Daily PRN Wilma Swanson MD        promethazine (PHENERGAN) tablet 12.5 mg  12.5 mg Oral Q6H PRN Wilma Swanson MD        Or    ondansetron [facility]) injection 4 mg  4 mg Intravenous Q6H PRN Wilma Swanson MD        piperacillin-tazobactam (ZOSYN) 3.375 g in dextrose 5 % 100 mL IVPB extended infusion (mini-bag)  3.375 g Intravenous Q8H Wilma Swanson MD   Stopped at 04/20/20 0230    lactated ringers infusion   Intravenous Continuous Wilam Swanson  mL/hr at 04/19/20 2219      0.9 % sodium chloride infusion admixture  25 mL Intravenous Q8H Wilma Swanson MD   Stopped at 04/20/20 0348    ipratropium-albuterol (DUONEB) nebulizer solution 1 ampule  1 ampule Inhalation Q4H WA Wilma Swanson MD        vancomycin (VANCOCIN) 750 mg in dextrose 5 % 250 mL IVPB  750 mg Intravenous Q24H Wilma Swanson MD           Current  Infusions   lactated ringers 100 mL/hr at 04/19/20 2219       Prn Meds  sodium chloride flush, acetaminophen **OR** acetaminophen, polyethylene glycol, promethazine **OR** ondansetron    Radiology Review:  CTA PULMONARY W CONTRAST   Final Result   There is a persistent right hilar and AM  4/20/2020

## 2020-04-20 NOTE — CONSULTS
Pulmonary [facility]                             Pulmonary Consult/Progress Note :          Patient: Jaja Gray  MRN: [id number]  : 1940      Date of Admission: .2020 12:00 PM    Consulting Physician:Dr Gutierrez Casey         Reason for Consultation:Lung mass   CC : SOB ,appetite Loss and some weight loss   HPI:    Mr Venecia Porras is 79ear old male with 36 PPY smoking history who presented with worsening generalized weakness along with appetite loss ,he is undergoing chemo for lung cancer ad he had similar episode on 2020 and was admitted  He was  diagnosed with squamous cell carcinoma through   EBUS and we did station 7 that was + for Squmous cells , we  and he started on chemotherapy where he received his second dose few days ago          He notes that he is lost nearly 22 pounds over the past 6 months.      There is no vomiting ,He also denies any hemoptysis or change in his bowel habits.  He denies any chest discomfort presently.       He Does have a history of paroxysmal atrial fibrillation as well as significant coronary artery disease for which he underwent previous CABGS and subsequent catheter-based coronary intervention.        PAST MEDICAL HISTORY:     Past Medical History:   Diagnosis Date    Anxiety     CAD (coronary artery disease)     [facility] 3/4/2020    Carcinoma of right lung ([facility] 3/4/2020    Gout     Hyperlipidemia     Hypertension        PAST SURGICAL HISTORY:   Past Surgical History:   Procedure Laterality Date    BRONCHOSCOPY N/A 2020    EBUS BRONCHOSCOPY performed by Ayana Thayer MD at [address] [facility]  2020    BRONCHOSCOPY W/EBUS FNA performed by Ayana Thayer MD at [address] [facility] COLONOSCOPY  2012    polyp,diverticulosis    COLONOSCOPY N/A 2019    COLONOSCOPY DIAGNOSTIC performed by Martin Araya MD at [address] Status Former Smoker    Types: Cigarettes    Last attempt to quit: 1989    Years since quittin.7   Smokeless Tobacco Never Used     Social History     Substance and Sexual Activity   Alcohol Use No     Social History     Substance and Sexual Activity   Drug Use No         HOME MEDICATIONS:  Prior to Admission medications    Medication Sig Start Date End Date Taking? Authorizing Provider   megestrol (MEGACE) 40 MG tablet Take 40 mg by mouth daily   Yes Historical Provider, MD   HYDROmorphone (DILAUDID) 2 MG tablet Take 2 mg by mouth every 4 hours. Yes Historical Provider, MD   apixaban (ELIQUIS) 5 MG TABS tablet Take 1 tablet every 12 hours by oral route. Yes Historical Provider, MD   clopidogrel (PLAVIX) 75 MG tablet Take 1 tablet every day by oral route for 90 days.    Yes Historical Provider, MD   ferrous sulfate 325 (65 Fe) MG EC tablet Take 325 mg by mouth daily (with breakfast)   Yes Historical Provider, MD   sucralfate (CARAFATE) 1 GM tablet Take 1 tablet by mouth 2 times daily 19  Yes Danyelle Grullon MD   olmesartan (BENICAR) 40 MG tablet Take 1 tablet by mouth daily 10/3/19  Yes Vianey Beltre MD   NIFEdipine (ADALAT CC) 30 MG extended release tablet Take 1 tablet by mouth daily 10/4/19  Yes Vianey Beltre MD   atorvastatin (LIPITOR) 20 MG tablet Take 20 mg by mouth daily   Yes Historical Provider, MD       CURRENT MEDICATIONS:  Current Facility-Administered Medications: apixaban (ELIQUIS) tablet 5 mg, 5 mg, Oral, BID  atorvastatin (LIPITOR) tablet 20 mg, 20 mg, Oral, Daily  clopidogrel (PLAVIX) tablet 75 mg, 75 mg, Oral, Daily  ferrous sulfate (IRON 325) tablet 325 mg, 325 mg, Oral, Daily with breakfast  HYDROmorphone (DILAUDID) tablet 2 mg, 2 mg, Oral, Q4H  sucralfate (CARAFATE) tablet 1 g, 1 g, Oral, BID  sodium chloride flush 0.9 % injection 10 mL, 10 mL, Intravenous, 2 times per day  sodium chloride flush 0.9 % injection 10 mL, 10 mL, Intravenous, PRN  acetaminophen (TYLENOL) 04/20/20  0550    142   K 4.2 4.1    109*   CO2 19* 22   BUN 30* 21   CREATININE 1.2 0.9       MG:   Lab Results   Component Value Date    MG 2.1 02/16/2020     Ca/Phos:   Lab Results   Component Value Date    CALCIUM 6.7 (L) 04/20/2020     Amylase: No results found for: AMYLASE  Lipase: No results found for: LIPASE  LIVER PROFILE:   Recent Labs     04/19/20  1233   AST 69*   ALT 25   BILITOT 0.9   ALKPHOS 157*       PT/INR:   Recent Labs     04/19/20  1233   PROTIME 27.0*   INR 2.4     APTT:   Recent Labs     04/19/20  1233   APTT 33.2       Cardiac Enzymes:  Lab Results   Component Value Date    TROPONINI 0.01 04/19/2020             PROBLEM LIST:  Patient Active Problem List   Diagnosis    Coronary artery disease    Hypertension    Hyperlipidemia    Olecranon bursitis of left elbow    Chronic pain of right knee    Left hip pain    Atrial fibrillation and flutter (HCC)    Severe protein-calorie malnutrition (HCC)    Failure to thrive (0-17)    Carcinoma of right lung ([address]    Encounter for insertion of venous access port    Acute aspiration pneumonia (HCC)    Shortness of breath               ASSESSMENT:  1.)Squamous cell Lung cancer   2. )? Aspiration pneumonia   3.)A Fib/CAD on Plavix and eliquis   4.)Possible COPD   5.)Weight loss      PLAN:  *-Symptoms seems related to his chemotherapy   *-reviewed CT chest ,mass still present ,not much response to chemotherapy   Agree with Zosyn ,may stop vancomycin next 24 ,pending sputum culture   *-he is on Plavix and Eliquis ,  *-IVF   BD   Sputum culture  Procalc  CRP       Thank you Dr Martha Stallworth  very much for allowing me to participate in the care of this pleasant patient , should you have any questions ,please do not hesitate to contact me         [address]     NOTE: This report was transcribed using voice recognition software.  Every effort was made to ensure accuracy;

## 2020-04-21 NOTE — PLAN OF CARE
Problem: Falls - Risk of:  Goal: Will remain free from falls  Description: Will remain free from falls  4/21/2020 0427 by Dhaval Garcia RN  Outcome: Met This Shift     Problem: Falls - Risk of:  Goal: Absence of physical injury  Description: Absence of physical injury  4/21/2020 0427 by Dhaval Garcia RN  Outcome: Met This Shift     Problem: Breathing Pattern - Ineffective:  Goal: Ability to achieve and maintain a regular respiratory rate will improve  Description: Ability to achieve and maintain a regular respiratory rate will improve  4/21/2020 0427 by Dhaval Garcia RN  Outcome: Met This Shift

## 2020-04-22 NOTE — DISCHARGE SUMMARY
Called patients daughter Valerie Jorge and went over discharge instructions with her since the patient will not remember. Daughter will be here in thirty minutes to get him. Went over everything with her.

## 2020-04-22 NOTE — DISCHARGE SUMMARY
Discharge Summary    Rabia Bustamante  :  1940  MRN:  [id number]    Admit date:  2020  Discharge date:  2020 1:21 PM    Admitting Physician:  Orville Hopkins MD    Discharge Diagnoses:    Multifactorial dyspnea on exertion   Status post chemotherapy   Extensive lung disease   Anemia  History of lung cancer   There is no pneumonia        Past Medical Hx :   Past Medical History:   Diagnosis Date    Anxiety     CAD (coronary artery disease)     Carcinoma of right lung ([facility] 3/4/2020    Carcinoma of right lung ([facility] 3/4/2020    Gout     Hyperlipidemia     Hypertension        Past Surgical Hx :   Past Surgical History:   Procedure Laterality Date    BRONCHOSCOPY N/A 2020    EBUS BRONCHOSCOPY performed by Brennen Amos MD at [address] [facility], [place]  2020    BRONCHOSCOPY W/EBUS FNA performed by Brennen Amos MD at [address] COLONOSCOPY  2012    polyp,diverticulosis    COLONOSCOPY N/A 2019    COLONOSCOPY DIAGNOSTIC performed by Jazmine Hunt MD at [facility] GRAFT      ENDOSCOPY, COLON, DIAGNOSTIC      PORT SURGERY N/A 3/6/2020    PORT INSERTION performed by Hailey Larson MD at [address] [place] [address] 2019    EGD BIOPSY performed by Jazmine Hunt MD at [facility] ENDOSCOPY       Admission Condition:  fair    Discharged Condition:  good    Labs:  CBC:   Lab Results   Component Value Date    WBC 9.2 2020    RBC 2.73 2020    HGB 7.2 2020    HCT 24.1 2020    MCV 88.3 2020    MCH 26.4 2020    MCHC 29.9 2020    RDW 21.0 2020     2020    MPV 10.6 2020     CMP:    Lab Results   Component Value Date     2020    K 4.1 2020     2020    CO2 22 2020    BUN 21 2020    CREATININE 0.9 2020    GFRAA >60 2020    LABGLOM >60 2020 new, and could be an endobronchial mass or either aspirated material or mucous accumulation. Just above the wendy, the largest portion has a diameter of about 12 mm. This was not present on the comparison study of January 2020. Soft tissue window images show a right chest wall central venous infusion port catheter, cardiomegaly with coronary calcification, and the ascending thoracic aorta is mildly ectatic with AP and transverse diameter measurements of 3.9 x 3.7 cm. Bone window images show no acute chest wall traumatic findings. Exuberant hyperostotic vertebral articular changes are noted, but there is no evidence of skeletal metastatic change. Upper abdominal images show no evidence of acute visceral pathology. There is a retrocardiac hiatus hernia, and there is prominent pneumobilia in the liver as well as surgical changes of prior cholecystectomy. There is no radiopaque biliary stent, but sphincterotomy could cause this appearance. The adrenals are unremarkable. There is irregular appearance to the upper pole of left kidney, which is likely in the spectrum of cortical atrophy, compared to the prior study. There is a persistent right hilar and infrahilar as well as paramediastinal mass in the medial right lower lung which occludes the lower lobe pulmonary artery on the right as well as some of the right mainstem bronchus. There is some associated right lower lobe nodularity and interstitial density which could be bronchogenic spread or even infectious or atelectatic change related to the central mass. There is a new finding of lobulated intraluminal material along the distal trachea above the wendy on the right. This could be aspirated material. It was not present in January 2020. Cardiomegaly, retrocardiac hiatus hernia, and pneumobilia are among the incidental nonacute findings listed above.        Hospital Course:  CC: Extreme weakness     HPI: This patient presented to the emergency room with complaints Conjunctivae/cornea clear. Lorel Fraction. Sclera non icteric. Somewhat pale appearing  Ears: External appearance normal.  Hearing grossly normal  Nose/Sinuses: Nares normal.   Mouth: Lips and tongue appear normal. Dentition noted  Neck:  Symmetric. No adenopathy. Thyroid symmetric, normal size, without nodules. Trachea is midline. Chest: Even excursion   Lungs: Clear to auscultation. Reduced ventilation bilaterally  Heart: S1 > S2. Rhythm is regular and rate is normal. No gallop rub or murmur. Abdomen: Soft, mildly protuberant, non-tender. BS normal. No masses, organomegaly. Anatomic contours appear normal.  Extremities: No deformities, edema, or skin discoloration. Peripheral perfusion assessed in all exremities. No cyanosis  Musculoskeletal: No unusual pain or swelling. Muscular strength intact. Neuro:   · Cranial nerves grossly intact. · Motor: Diffusely and symmetrically weak  Mental status: Awake, alert, cognizant of person, place, time.     Patient appears capable of directing self care   Mood: Normal and appropriate affect      Disposition: home    Patient Instructions:   REFER TO AVR or MORRO document    Signed:  Tavon Matta  [facility]  [employer] Ger[employer]  4/22/2020, 1:21 PM

## 2020-04-22 NOTE — PROGRESS NOTES
Dr. Nicol Xiong notified of patients admission to the floor
Pharmacy Consultation Note  (Antibiotic Dosing and Monitoring)    Initial consult date: 2020  Consulting physician: Amaya Coto  Drug: Vancomycin  Indication: aspiration pneumonia    Age/  Gender Height Weight IBW Dosing weight  Allergy Information   80 y.o./male 5' 7\" (170.2 cm) 118 lb (53.5 kg)     Ideal body weight: 66.1 kg (145 lb 11.6 oz)  53.5  Patient has no known allergies. Temp (24hrs), Av.6 °F (36.4 °C), Min:97 °F (36.1 °C), Max:97.9 °F (36.6 °C)          Date  WBC BUN SCr CrCl  (mL/min) Drug/Dose Time   Given Level(s)   (Time) Comments    11.5 30 1.2  37 Vancomycin 1000 mg IV  220                                          No intake or output data in the 24 hours ending 20    Historical Cultures:  No results found for: ORG  No results for input(s): BC in the last 72 hours. Cultures:  available culture and sensitivity results were reviewed in [facility]    Assessment:  · 79 y.o. male has been initiated Vancomycin.   · Estimated CrCl = 37 mL/min  · Goal trough level = 15-20 mcg/mL    Plan:  · Will initiate vancomycin at a dose of 750 Q24H 2020 @ 2200  · Monitor renal function   · Clinical pharmacy to follow      DONNIE Gonzalez [facility] 2020 8:40 PM
Pharmacy Consultation Note  (Antibiotic Dosing and Monitoring)    Initial consult date: 2020  Consulting physician: Dr. Fátima Canela  Drug(s): vancomycin  Indication: PNA (aspiration?)  Age/Gender IBW DW  Allergy Information   80 y.o./M; 170.2 cm  53.5 kg  Patient has no known allergies. Date  WBC BUN/CR Drug/Dose Time   Given Level(s)   (Time) Comments        9.2   21/0.9 vancomycin 1000 mg x1    vancomycin 750 mg q24h    vanco 750 mg q24h                          Estimated Creatinine Clearance: 49 mL/min (based on SCr of 0.9 mg/dL). Intake/Output Summary (Last 24 hours) at 2020 1022  Last data filed at 2020 0901  Gross per 24 hour   Intake 960 ml   Output 550 ml   Net 410 ml   UO = 0.7 mL/kg/hr (850 mL)    Temp max: Temp (24hrs), Av.4 °F (36.9 °C), Min:97.1 °F (36.2 °C), Max:99.4 °F (37.4 °C)      Cultures:  available culture and sensitivity results were reviewed in [facility]    Blood (2 sets @1233): NGTD ( @ 1435)    Blood (2 sets @ ): NGTD ( @ 2235)    Assessment:  · Consulted by Dr. Fátima Cast. Quentin to dose/monitor vancomycin. · Goal trough level:  15-20 mCg/mL. · 81 yo/M admitted from home for decreased appetite, fatigue, and SOB (MENDOZA). Undergoing active Tx for lung CA. CTA revealed R lung infiltrate (possible aspirated material) or extension of CA. · Empiric ATBs (vanco and pip-tazo) started for suspected aspiration PNA. · Received vanco 1000 mg x1 early  @ 214 and ordered to start 1000 mg q12h. · : day #2 vanco and pip-tazo. Plan:  · Continue vancomycin 750 mg q24h. · Will check vancomycin trough level when steady state is attained if vanco continues and if Pharmacy is to continue the consult. · Pharmacist will follow and monitor/adjust dosing as necessary.     Kendrick Negrete PharmD  2020  10:22 AM  Pager: [phone]
Pharmacy Consultation Note  (Antibiotic Dosing and Monitoring)    Initial consult date: 2020  Consulting physician: Dr. Heather Howard. Terri Evans  Drug(s): vancomycin  Indication: PNA (aspiration?)  Age/Gender IBW DW  Allergy Information   80 y.o./M; 170.2 cm  53.5 kg  Patient has no known allergies. Date  WBC BUN/CR Drug/Dose Time   Given Level(s)   (Time) Comments        9.2   21/0.9 vancomycin 1000 mg x1    vancomycin 750 mg q24h 0214    <2200>                                  Estimated Creatinine Clearance: 49 mL/min (based on SCr of 0.9 mg/dL). Intake/Output Summary (Last 24 hours) at 2020 0754  Last data filed at 2020 0421  Gross per 24 hour   Intake 480 ml   Output 275 ml   Net 205 ml       Temp max: Temp (24hrs), Av.7 °F (36.5 °C), Min:97 °F (36.1 °C), Max:98 °F (36.7 °C)      Cultures:  available culture and sensitivity [facility]      Assessment:  · Consulted by Dr. Heather Howard. Quentin to dose/monitor vancomycin. · Goal trough level:  15-20 mCg/mL. · 81 yo/M admitted from home for decreased appetite, fatigue, and SOB (MENDOZA). Undergoing active Tx for lung CA. CTA revealed R lung infiltrate (possible aspirated material) or extension of CA. · Empiric ATBs (vanco and pip-tazo) started for suspected aspiration PNA. · Received vanco 1000 mg x1 early  @ 0214 and ordered to start 1000 mg q12h. Plan:  · Change vancomycin to 750 mg q24h. · Will check vancomycin trough level when steady state is attained if vanco continues and if Pharmacy is to continue the consult. · Pharmacist will follow and monitor/adjust dosing as necessary.     Nikita Howard PharmD  2020  10:36 AM  Pager: [phone]
Pulmonary consult placed via perfect serve to Dr Ciera Le
Spoke to daughter Emy Matthews this morning on the phone and gave her an update.
thrive (0-17)    Carcinoma of right lung ([address]    Encounter for insertion of venous access port    Acute aspiration pneumonia ([address]    Shortness of breath               ASSESSMENT:  1.)Squamous cell Lung cancer   2. )? Aspiration pneumonia   3.)A Fib/CAD on Plavix and eliquis   4.)Possible COPD   5.)Weight loss      PLAN:  *-Symptoms seems related to his chemotherapy side effects   *-reviewed CT chest ,mass still present ,not much response to chemotherapy   Stop Vancomycin  Nasal MRSA swap  on Plavix and Eliquis ,  *-IVF   BD   Sputum culture,not able to give sample  Procalc 0.61 was 0.84 on Feb ,doubt significant   CRP in am            [address]     NOTE: This report was transcribed using voice recognition software. Every effort was made to ensure accuracy; however, inadvertent computerized transcription errors may be present.

## 2020-04-23 NOTE — CARE COORDINATION
symptoms of COVID-19 and when to seek medical attention with family who verbalized understanding. Discussed exposure protocols and quarantine from [address] you at higher risk for severe illness 2019 and given an opportunity for questions and concerns. The family agrees to contact the COVID-19 hotline [phone] or PCP office for questions related to their healthcare. CTN/ACM provided contact information for future reference. From CDC: Are you at higher risk for severe illness?  Wash your hands often.  Avoid close contact (6 feet, which is about two arm lengths) with people who are sick.  Put distance between yourself and other people if COVID-19 is spreading in your community.  Clean and disinfect frequently touched surfaces.  Avoid all cruise travel and non-essential air travel.  Call your healthcare professional if you have concerns about COVID-19 and your underlying condition or if you are sick. For more information on steps you can take to protect yourself, see CDC's How to [id number] [place] [address] for follow-up call in 5-7 days based on severity of symptoms and risk factors. Follow Up  No future appointments.     Vanessa Cosby RN

## 2020-04-30 NOTE — CARE COORDINATION
[facility] Transitions Follow Up Call    2020    Patient: Sunshine Brown  Patient : 1940   MRN: [id number]  Reason for Admission: Aspiration pneumonia right lower lobe  Discharge Date: 20 RARS: Readmission Risk Score: 25         Spoke with: Montana Marleni, patient's daughter with prior permission from patient    Covid 23 Monitoring Episode resolved on 20    Patient/family has been provided the following resources and education related to COVID-19:                         Signs, symptoms and red flags related to COVID-19            CDC exposure and quarantine guidelines            [facility] exposure contact - [phone]            Contact for their local Department of Health                Patient's daughter reports patient does not have any s/s of Covid-19     Per Montana Yepez, patient completed Omnicef therapy and continues to take Nifedipine 15 mg po daily. Patient is gaining strength slowly. Per Montana Yepez, patient has his good days with his appetite and bad days. Patient is keeping hydrated. Patient is using his walker when ambulating; Montana Yepez denies falls. Patient is scheduled for PET scan on 20 and appt with Dr. Denice Laguna on 20. Patient's daughter will transport patient to appts. No further outreach scheduled with this CTN/ACM. Episode of Care resolved. Patient has this CTN/ACM contact information if future needs arise. Follow Up  No future appointments.     Chrissy Steward RN

## 2020-08-10 NOTE — TELEPHONE ENCOUNTER
Dr. Margy Ayon called Dtr Linsey Ulloa after VM left. Pt having recurrent cough. Dr states he gets a dry cough as if something is stuck for about 2 days ,will spit up blood tinged mucous maybe once or twice than it goes away for a couple weeks,  He has been complaining of dizziness and bad headaches for at least the past 6-8 weeks. Dr Mariana Saavedra has not addressed the dizziness, but gave him Fiorcet, That made him go crazy,so we stopped that. Family Dr, Dr Ester You gave him a [place] shot which didn't help much either. Discussed at length with Dtr recent CT reports she has emailed him and that it is likely that pt is experiencing advancing of cancer and that his symptoms may be relieved some with steroid taper and antibiotic. Dtr would like to discuss Hospice /Pallative care with PCP. Will order medications for pt and Dtr to call after meds completed if he does not get relief.

## (undated) DEVICE — GLOVE ORANGE PI 7 1/2   MSG9075

## (undated) DEVICE — SURGICAL PROCEDURE PACK BRONCH

## (undated) DEVICE — DEFENDO AIR WATER SUCTION AND BIOPSY VALVE KIT FOR  OLYMPUS: Brand: DEFENDO AIR/WATER/SUCTION AND BIOPSY VALVE

## (undated) DEVICE — SURGICAL PROCEDURE PACK VASC MAJ CUST

## (undated) DEVICE — SYRINGE MED 10ML POLYPR LUERSLIP TIP FLAT TOP W/O SFTY DISP

## (undated) DEVICE — Z DUP USE 2257490 ADHESIVE SKIN CLSRE 036ML TPCL 2CTL CNCRLTE HIGH VSCSTY DRMB

## (undated) DEVICE — FORCEPS BX L160CM JAW DIA2.4MM YEL L CAP W/ NDL DISP RAD

## (undated) DEVICE — NEEDLE ASPIR 22GA L40MM WRK L70CM SYR VLV ECHOGENIC DIMPLED

## (undated) DEVICE — GLOVE ORANGE PI 8   MSG9080

## (undated) DEVICE — CONNECTOR TBNG AUX H2O JET DISP FOR OLY 160/180 SER

## (undated) DEVICE — SET SURG INSTR MINI VASC

## (undated) DEVICE — GLOVE SURG SZ 7.5 L11.73IN FNGR THK9.8MIL STRW LTX POLYMER

## (undated) DEVICE — DRAPE C ARM UNIV W41XL74IN CLR PLAS XR VELC CLSR POLY STRP

## (undated) DEVICE — MAGNETIC INSTR DRAPE 20X16: Brand: MEDLINE INDUSTRIES, INC.

## (undated) DEVICE — SOLUTION IV IRRIG 500ML 0.9% SODIUM CHL 2F7123

## (undated) DEVICE — E-Z CLEAN, NON-STICK, PTFE COATED, ELECTROSURGICAL BLADE ELECTRODE, MODIFIED EXTENDED INSULATION, 2.5 INCH (6.35 CM): Brand: MEGADYNE

## (undated) DEVICE — CONTAINER SPEC 60ML PH 7NEUTRAL BUFF FRMLN RDY TO USE

## (undated) DEVICE — ADAPTER TBNG DIA15MM SWVL FBROPT BRONCHSCP TERM 2 AXIS PEEP

## (undated) DEVICE — Device: Brand: MEDEX

## (undated) DEVICE — GOWN,SIRUS,FABRNF,XL,20/CS: Brand: MEDLINE

## (undated) DEVICE — SOLUTION IV 100ML 0.9% SOD CHL PLAS CONT USP VIAFLX 1 PER

## (undated) DEVICE — NEEDLE HYPO 25GA L1.5IN BLU POLYPR HUB S STL REG BVL STR

## (undated) DEVICE — CANNULA NSL ORAL AD FOR CAPNOFLEX CO2 O2 AIRLFE

## (undated) DEVICE — PATIENT RETURN ELECTRODE, SINGLE-USE, CONTACT QUALITY MONITORING, ADULT, WITH 9FT CORD, FOR PATIENTS WEIGING OVER 33LBS. (15KG): Brand: MEGADYNE

## (undated) DEVICE — LABEL MED 4 IN SURG PANEL W/ PEN STRL

## (undated) DEVICE — PACK,LAPAROTOMY,NO GOWNS: Brand: MEDLINE

## (undated) DEVICE — SYRINGE MED 50ML LUERLOCK TIP

## (undated) DEVICE — BLADE CLIPPER GEN PURP NS

## (undated) DEVICE — BLOCK BITE 60FR RUBBER ADLT DENTAL

## (undated) DEVICE — CLOTH SURG PREP PREOPERATIVE CHLORHEXIDINE GLUC 2% READYPREP

## (undated) DEVICE — SET EXTN IV L30IN TBNG DIA0.1IN PRIMING 4ML MACBOR FEM ADPT

## (undated) DEVICE — GOWN,AURORA,NONREINF,RAGLAN,L,STERILE: Brand: MEDLINE

## (undated) DEVICE — DOUBLE BASIN SET: Brand: MEDLINE INDUSTRIES, INC.

## (undated) DEVICE — Device: Brand: BALLOON

## (undated) DEVICE — GAUZE,SPONGE,POST-OP,4X3,STRL,LF: Brand: MEDLINE